# Patient Record
Sex: FEMALE | Race: WHITE | NOT HISPANIC OR LATINO | URBAN - METROPOLITAN AREA
[De-identification: names, ages, dates, MRNs, and addresses within clinical notes are randomized per-mention and may not be internally consistent; named-entity substitution may affect disease eponyms.]

---

## 2021-06-02 ENCOUNTER — EMERGENCY (EMERGENCY)
Facility: HOSPITAL | Age: 51
LOS: 0 days | Discharge: HOME | End: 2021-06-02
Attending: EMERGENCY MEDICINE | Admitting: EMERGENCY MEDICINE
Payer: MEDICAID

## 2021-06-02 VITALS
WEIGHT: 124.34 LBS | SYSTOLIC BLOOD PRESSURE: 95 MMHG | RESPIRATION RATE: 20 BRPM | TEMPERATURE: 98 F | OXYGEN SATURATION: 100 % | HEART RATE: 81 BPM | DIASTOLIC BLOOD PRESSURE: 53 MMHG

## 2021-06-02 DIAGNOSIS — R63.4 ABNORMAL WEIGHT LOSS: ICD-10-CM

## 2021-06-02 DIAGNOSIS — F17.200 NICOTINE DEPENDENCE, UNSPECIFIED, UNCOMPLICATED: ICD-10-CM

## 2021-06-02 DIAGNOSIS — I95.9 HYPOTENSION, UNSPECIFIED: ICD-10-CM

## 2021-06-02 LAB
ALBUMIN SERPL ELPH-MCNC: 3.6 G/DL — SIGNIFICANT CHANGE UP (ref 3.5–5.2)
ALP SERPL-CCNC: 115 U/L — SIGNIFICANT CHANGE UP (ref 30–115)
ALT FLD-CCNC: 67 U/L — HIGH (ref 0–41)
ANION GAP SERPL CALC-SCNC: 9 MMOL/L — SIGNIFICANT CHANGE UP (ref 7–14)
AST SERPL-CCNC: 61 U/L — HIGH (ref 0–41)
BASOPHILS # BLD AUTO: 0.05 K/UL — SIGNIFICANT CHANGE UP (ref 0–0.2)
BASOPHILS NFR BLD AUTO: 0.5 % — SIGNIFICANT CHANGE UP (ref 0–1)
BILIRUB SERPL-MCNC: <0.2 MG/DL — SIGNIFICANT CHANGE UP (ref 0.2–1.2)
BUN SERPL-MCNC: 7 MG/DL — LOW (ref 10–20)
CALCIUM SERPL-MCNC: 9 MG/DL — SIGNIFICANT CHANGE UP (ref 8.5–10.1)
CHLORIDE SERPL-SCNC: 103 MMOL/L — SIGNIFICANT CHANGE UP (ref 98–110)
CO2 SERPL-SCNC: 24 MMOL/L — SIGNIFICANT CHANGE UP (ref 17–32)
CREAT SERPL-MCNC: 0.6 MG/DL — LOW (ref 0.7–1.5)
EOSINOPHIL # BLD AUTO: 0.24 K/UL — SIGNIFICANT CHANGE UP (ref 0–0.7)
EOSINOPHIL NFR BLD AUTO: 2.6 % — SIGNIFICANT CHANGE UP (ref 0–8)
GLUCOSE SERPL-MCNC: 103 MG/DL — HIGH (ref 70–99)
HCG SERPL QL: NEGATIVE — SIGNIFICANT CHANGE UP
HCT VFR BLD CALC: 36.7 % — LOW (ref 37–47)
HGB BLD-MCNC: 12.2 G/DL — SIGNIFICANT CHANGE UP (ref 12–16)
IMM GRANULOCYTES NFR BLD AUTO: 0.2 % — SIGNIFICANT CHANGE UP (ref 0.1–0.3)
LACTATE SERPL-SCNC: 0.8 MMOL/L — SIGNIFICANT CHANGE UP (ref 0.7–2)
LYMPHOCYTES # BLD AUTO: 4.08 K/UL — HIGH (ref 1.2–3.4)
LYMPHOCYTES # BLD AUTO: 44.2 % — SIGNIFICANT CHANGE UP (ref 20.5–51.1)
MCHC RBC-ENTMCNC: 31.5 PG — HIGH (ref 27–31)
MCHC RBC-ENTMCNC: 33.2 G/DL — SIGNIFICANT CHANGE UP (ref 32–37)
MCV RBC AUTO: 94.8 FL — SIGNIFICANT CHANGE UP (ref 81–99)
MONOCYTES # BLD AUTO: 0.47 K/UL — SIGNIFICANT CHANGE UP (ref 0.1–0.6)
MONOCYTES NFR BLD AUTO: 5.1 % — SIGNIFICANT CHANGE UP (ref 1.7–9.3)
NEUTROPHILS # BLD AUTO: 4.38 K/UL — SIGNIFICANT CHANGE UP (ref 1.4–6.5)
NEUTROPHILS NFR BLD AUTO: 47.4 % — SIGNIFICANT CHANGE UP (ref 42.2–75.2)
NRBC # BLD: 0 /100 WBCS — SIGNIFICANT CHANGE UP (ref 0–0)
PLATELET # BLD AUTO: 315 K/UL — SIGNIFICANT CHANGE UP (ref 130–400)
POTASSIUM SERPL-MCNC: 4.6 MMOL/L — SIGNIFICANT CHANGE UP (ref 3.5–5)
POTASSIUM SERPL-SCNC: 4.6 MMOL/L — SIGNIFICANT CHANGE UP (ref 3.5–5)
PROT SERPL-MCNC: 6.3 G/DL — SIGNIFICANT CHANGE UP (ref 6–8)
RBC # BLD: 3.87 M/UL — LOW (ref 4.2–5.4)
RBC # FLD: 14.3 % — SIGNIFICANT CHANGE UP (ref 11.5–14.5)
SODIUM SERPL-SCNC: 136 MMOL/L — SIGNIFICANT CHANGE UP (ref 135–146)
WBC # BLD: 9.24 K/UL — SIGNIFICANT CHANGE UP (ref 4.8–10.8)
WBC # FLD AUTO: 9.24 K/UL — SIGNIFICANT CHANGE UP (ref 4.8–10.8)

## 2021-06-02 PROCEDURE — 93010 ELECTROCARDIOGRAM REPORT: CPT

## 2021-06-02 PROCEDURE — 71045 X-RAY EXAM CHEST 1 VIEW: CPT | Mod: 26

## 2021-06-02 PROCEDURE — 99285 EMERGENCY DEPT VISIT HI MDM: CPT

## 2021-06-02 RX ORDER — SODIUM CHLORIDE 9 MG/ML
1000 INJECTION INTRAMUSCULAR; INTRAVENOUS; SUBCUTANEOUS ONCE
Refills: 0 | Status: COMPLETED | OUTPATIENT
Start: 2021-06-02 | End: 2021-06-02

## 2021-06-02 RX ADMIN — SODIUM CHLORIDE 1000 MILLILITER(S): 9 INJECTION INTRAMUSCULAR; INTRAVENOUS; SUBCUTANEOUS at 21:58

## 2021-06-02 NOTE — ED ADULT TRIAGE NOTE - BEFAST ARM SIDE DRIFT
Patient Class[de-identified] Observation [202] Type of Bed: Telemetry [19] Reason for Observation: Tachycardia Admitting Diagnosis: Tachycardia [[id number]] Admitting Physician: Agustin Ryan [[id number]] Attending Physician: Agustin Ryan [[id number]] No

## 2021-06-02 NOTE — ED ADULT NURSE NOTE - OBJECTIVE STATEMENT
pt complains of loss of appetite and unintentional weight loss recently, pt denies abd pain, n/v/d, dizziness, palpitations, numbness, tingling

## 2021-06-02 NOTE — ED PROVIDER NOTE - NSFOLLOWUPINSTRUCTIONS_ED_ALL_ED_FT
Hypotension (Low Blood Pressure)    Foods  Whole grains. Lean meats. Low-fat dairy products. Eat extra salt only as directed. Do not add extra salt to your diet unless your health care provider told you to do that. Eat frequent, small meals. Avoid standing up suddenly after eating.    Medicines   Take over-the-counter and prescription medicines only as told by your health care provider.  Follow instructions from your health care provider about changing the dosage of your current medicines, if this applies.  Do not stop or adjust any of your medicines on your own.    General instructions   Wear compression stockings as told by your health care provider.  Get up slowly from lying down or sitting positions. This gives your blood pressure a chance to adjust.  Avoid hot showers and excessive heat as directed by your health care provider.  Return to your normal activities as told by your health care provider. Ask your health care provider what activities are safe for you.  Do not use any products that contain nicotine or tobacco, such as cigarettes, e-cigarettes, and chewing tobacco. If you need help quitting, ask your health care provider.  Keep all follow-up visits as told by your health care provider. This is important.    Contact a health care provider if you:  Vomit. Have diarrhea. Have a fever for more than 2–3 days. Feel more thirsty than usual. Feel weak and tired.    Get help right away if you:  Have chest pain. Have a fast or irregular heartbeat. Develop numbness in any part of your body. Cannot move your arms or your legs. Have trouble speaking. Become sweaty or feel light-headed. Faint. Feel short of breath. Have trouble staying awake. Feel confused.    Summary  Hypotension is when the force of blood pumping through your arteries is too weak. Hypotension may be harmless (benign) or may cause serious problems (be critical). Treatment for this condition may include changing your diet, changing your medicines, and wearing compression stockings. In some cases, you may need to go to the hospital for fluid or blood replacement. This information is not intended to replace advice given to you by your health care provider. Make sure you discuss any questions you have with your health care provider.

## 2021-06-02 NOTE — ED PROVIDER NOTE - ATTENDING CONTRIBUTION TO CARE
50Y F with PMH of HTN?, not on any medications presents with CC of weight loss and hypotension. Patient was at PMD's office for evaluation of having progressive weight loss about 20 lbs over 2 months, and found to have systolic in the 80s. Denies fevers, chills, chest pain, SOB, abdominal pain, N/V/D. No family hx of cancer.    50F smoker pmh anemia on supple Fe referred from PCPs office for wt loss & hypotension. Rpts seeing her pcp today because family/friends have been telling her she lost too much weight. Saw a new NP @ her PCPs office today,  was reviewed and pt found with 13 lb wt loss over 1 year. Also found hypotensive in office 80s/50s, although pt endorses known h/o lower BP. NP advised pt to come to ED for further testing. Pt denies any other sx. No f/c, uri sx, cp/sob/more, cough, hemoptysis, nvd, abd pain, flank pain, urinary sx, vag discharge, rash, edema. Has never had colonoscopy. No reported h/o familial Tone.    PE:  nad  skin warm, dry  ncat  neck supple  rrr nl s1s2 no mrg  ctab no wrr  abd soft ntnd no palpable masses no rgr  back non-tender no cvat  ext no cce dpi  neuro aaox3 grossly nf exam

## 2021-06-02 NOTE — ED PROVIDER NOTE - NS ED ROS FT
Review of Systems:  CONSTITUTIONAL - No fever  SKIN - No rash  HEMATOLOGIC - No abnormal bleeding or bruising  RESPIRATORY - No shortness of breath, No cough  CARDIAC -No chest pain, No palpitations  GI - No abdominal pain, No nausea, No vomiting  - No dysuria, frequency, hematuria  MUSCULOSKELETAL - No joint paint, No swelling, No back pain  NEUROLOGIC - No numbness, No focal weakness, No headache, No dizziness  All other systems negative, unless specified in HPI

## 2021-06-02 NOTE — ED PROVIDER NOTE - CLINICAL SUMMARY MEDICAL DECISION MAKING FREE TEXT BOX
wt loss, hypotension (although reported history of chronic low bp), referred to ED for further testing - vss, ED w/u incl ekg, cxr, labs showing mild transaminitis, ua wnl - ct ap was ordered for screening purposes, however pt declined study requesting to be discharged to home as she has a child with special needs to care for, prefers to obtain ct as outpt - all results d/w pt & copies given, strict return precautions discussed, rec outpt PCP f/u - pt also has a previously scheduled appt with her Hem/Onc (sees for anemia) this Mon 6/7

## 2021-06-02 NOTE — ED PROVIDER NOTE - PROVIDER TOKENS
PROVIDER:[TOKEN:[[id number]],FOLLOWUP:[Urgent],ESTABLISHEDPATIENT:[T]],FREE:[LAST:[Hematologist/Oncologist],PHONE:[(   )    -],FAX:[(   )    -],ADDRESS:[your private Hem/Onc physician, as previously scheduled for this Mon 6/7/21],ESTABLISHEDPATIENT:[T]]

## 2021-06-02 NOTE — ED ADULT TRIAGE NOTE - CHIEF COMPLAINT QUOTE
patient c/o poor appetite x "a few weeks", went to PMD today for checkup and was hypotensive to 80s systolically. unintentional 13 lb weight loss since 2020. sent in for eval

## 2021-06-02 NOTE — ED PROVIDER NOTE - OBJECTIVE STATEMENT
50Y F with PMH of HTN?, not on any medications presents with CC of weight loss and hypotension. Patient was at PMD's office for evaluation of having progressive weight loss about 20 lbs over 2 months, and found to have systolic in the 80s. Denies fevers, chills, chest pain, SOB, abdominal pain, N/V/D. No family hx of cancer.

## 2021-06-02 NOTE — ED PROVIDER NOTE - PROGRESS NOTE DETAILS
PP: Patient does not want to stay for CT scans. Understands that weight loss can be related to benign causes but also can be cancer. Patient has kids at home that she needs to attend to and would like to go home. She understands to follow up with her PMD. BP here 95 systolic, as per patient she does have hx of low bp and is not concerned about it. Patient to be discharged from ED. Any available test results were discussed with patient and/or family. Verbal instructions given, including instructions to return to ED immediately for any new, worsening, or concerning symptoms. Patient endorsed understanding. Written discharge instructions additionally given, including follow-up plan.

## 2021-06-02 NOTE — ED PROVIDER NOTE - PATIENT PORTAL LINK FT
You can access the FollowMyHealth Patient Portal offered by [facility] by registering at the following website: [email or web address]. By joining [email or web address]’s FollowMyHealth portal, you will also be able to view your health information using other applications (apps) compatible with our system.

## 2021-06-02 NOTE — ED PROVIDER NOTE - CARE PROVIDER_API CALL
Alonso Bowers (NP; RN)  [place] [address]  Phone: [phone]  Fax: [phone]  Established Patient  Follow Up Time: Urgent    Hematologist/Oncologist,   your private Hem/Onc physician, as previously scheduled for this Mon 6/7/21  Phone: (   )    -  Fax: (   )    -  Established Patient  Follow Up Time:

## 2021-06-08 LAB
CULTURE RESULTS: SIGNIFICANT CHANGE UP
CULTURE RESULTS: SIGNIFICANT CHANGE UP
SPECIMEN SOURCE: SIGNIFICANT CHANGE UP
SPECIMEN SOURCE: SIGNIFICANT CHANGE UP

## 2022-05-08 ENCOUNTER — INPATIENT (INPATIENT)
Facility: HOSPITAL | Age: 52
LOS: 2 days | Discharge: HOME | End: 2022-05-11
Attending: PSYCHIATRY & NEUROLOGY | Admitting: PSYCHIATRY & NEUROLOGY
Payer: MEDICAID

## 2022-05-08 VITALS
DIASTOLIC BLOOD PRESSURE: 73 MMHG | HEART RATE: 101 BPM | TEMPERATURE: 98 F | OXYGEN SATURATION: 99 % | RESPIRATION RATE: 18 BRPM | SYSTOLIC BLOOD PRESSURE: 121 MMHG

## 2022-05-08 LAB
ALBUMIN SERPL ELPH-MCNC: 4.6 G/DL — SIGNIFICANT CHANGE UP (ref 3.5–5.2)
ALP SERPL-CCNC: 85 U/L — SIGNIFICANT CHANGE UP (ref 30–115)
ALT FLD-CCNC: 23 U/L — SIGNIFICANT CHANGE UP (ref 0–41)
ANION GAP SERPL CALC-SCNC: 15 MMOL/L — HIGH (ref 7–14)
APPEARANCE UR: CLEAR — SIGNIFICANT CHANGE UP
APTT BLD: SIGNIFICANT CHANGE UP SEC (ref 27–39.2)
AST SERPL-CCNC: 34 U/L — SIGNIFICANT CHANGE UP (ref 0–41)
BACTERIA # UR AUTO: NEGATIVE — SIGNIFICANT CHANGE UP
BASOPHILS # BLD AUTO: 0.05 K/UL — SIGNIFICANT CHANGE UP (ref 0–0.2)
BASOPHILS NFR BLD AUTO: 0.3 % — SIGNIFICANT CHANGE UP (ref 0–1)
BILIRUB SERPL-MCNC: <0.2 MG/DL — SIGNIFICANT CHANGE UP (ref 0.2–1.2)
BILIRUB UR-MCNC: NEGATIVE — SIGNIFICANT CHANGE UP
BUN SERPL-MCNC: 8 MG/DL — LOW (ref 10–20)
CALCIUM SERPL-MCNC: 9.8 MG/DL — SIGNIFICANT CHANGE UP (ref 8.5–10.1)
CHLORIDE SERPL-SCNC: 107 MMOL/L — SIGNIFICANT CHANGE UP (ref 98–110)
CO2 SERPL-SCNC: 20 MMOL/L — SIGNIFICANT CHANGE UP (ref 17–32)
COLOR SPEC: YELLOW — SIGNIFICANT CHANGE UP
COMMENT - URINE: SIGNIFICANT CHANGE UP
CREAT SERPL-MCNC: 0.6 MG/DL — LOW (ref 0.7–1.5)
DIFF PNL FLD: NEGATIVE — SIGNIFICANT CHANGE UP
EGFR: 110 ML/MIN/1.73M2 — SIGNIFICANT CHANGE UP
EOSINOPHIL # BLD AUTO: 0.19 K/UL — SIGNIFICANT CHANGE UP (ref 0–0.7)
EOSINOPHIL NFR BLD AUTO: 1.3 % — SIGNIFICANT CHANGE UP (ref 0–8)
EPI CELLS # UR: 3 /HPF — SIGNIFICANT CHANGE UP (ref 0–5)
GLUCOSE SERPL-MCNC: 88 MG/DL — SIGNIFICANT CHANGE UP (ref 70–99)
GLUCOSE UR QL: NEGATIVE — SIGNIFICANT CHANGE UP
HCT VFR BLD CALC: 34 % — LOW (ref 37–47)
HGB BLD-MCNC: 11.5 G/DL — LOW (ref 12–16)
HYALINE CASTS # UR AUTO: 2 /LPF — SIGNIFICANT CHANGE UP (ref 0–7)
IMM GRANULOCYTES NFR BLD AUTO: 0.5 % — HIGH (ref 0.1–0.3)
INR BLD: 0.98 RATIO — SIGNIFICANT CHANGE UP (ref 0.65–1.3)
KETONES UR-MCNC: NEGATIVE — SIGNIFICANT CHANGE UP
LEUKOCYTE ESTERASE UR-ACNC: NEGATIVE — SIGNIFICANT CHANGE UP
LYMPHOCYTES # BLD AUTO: 21.4 % — SIGNIFICANT CHANGE UP (ref 20.5–51.1)
LYMPHOCYTES # BLD AUTO: 3.12 K/UL — SIGNIFICANT CHANGE UP (ref 1.2–3.4)
MCHC RBC-ENTMCNC: 33.8 G/DL — SIGNIFICANT CHANGE UP (ref 32–37)
MCHC RBC-ENTMCNC: 34.4 PG — HIGH (ref 27–31)
MCV RBC AUTO: 101.8 FL — HIGH (ref 81–99)
MONOCYTES # BLD AUTO: 0.69 K/UL — HIGH (ref 0.1–0.6)
MONOCYTES NFR BLD AUTO: 4.7 % — SIGNIFICANT CHANGE UP (ref 1.7–9.3)
NEUTROPHILS # BLD AUTO: 10.46 K/UL — HIGH (ref 1.4–6.5)
NEUTROPHILS NFR BLD AUTO: 71.8 % — SIGNIFICANT CHANGE UP (ref 42.2–75.2)
NITRITE UR-MCNC: NEGATIVE — SIGNIFICANT CHANGE UP
NRBC # BLD: 0 /100 WBCS — SIGNIFICANT CHANGE UP (ref 0–0)
PH UR: 6.5 — SIGNIFICANT CHANGE UP (ref 5–8)
PLATELET # BLD AUTO: 273 K/UL — SIGNIFICANT CHANGE UP (ref 130–400)
POTASSIUM SERPL-MCNC: 4 MMOL/L — SIGNIFICANT CHANGE UP (ref 3.5–5)
POTASSIUM SERPL-SCNC: 4 MMOL/L — SIGNIFICANT CHANGE UP (ref 3.5–5)
PROT SERPL-MCNC: 7.1 G/DL — SIGNIFICANT CHANGE UP (ref 6–8)
PROT UR-MCNC: ABNORMAL
PROTHROM AB SERPL-ACNC: 11.3 SEC — SIGNIFICANT CHANGE UP (ref 9.95–12.87)
RBC # BLD: 3.34 M/UL — LOW (ref 4.2–5.4)
RBC # FLD: 15 % — HIGH (ref 11.5–14.5)
RBC CASTS # UR COMP ASSIST: 2 /HPF — SIGNIFICANT CHANGE UP (ref 0–4)
SARS-COV-2 RNA SPEC QL NAA+PROBE: SIGNIFICANT CHANGE UP
SODIUM SERPL-SCNC: 142 MMOL/L — SIGNIFICANT CHANGE UP (ref 135–146)
SP GR SPEC: >1.05 (ref 1.01–1.03)
TROPONIN T SERPL-MCNC: <0.01 NG/ML — SIGNIFICANT CHANGE UP
UROBILINOGEN FLD QL: SIGNIFICANT CHANGE UP
WBC # BLD: 14.58 K/UL — HIGH (ref 4.8–10.8)
WBC # FLD AUTO: 14.58 K/UL — HIGH (ref 4.8–10.8)
WBC UR QL: 4 /HPF — SIGNIFICANT CHANGE UP (ref 0–5)

## 2022-05-08 PROCEDURE — 0042T: CPT

## 2022-05-08 PROCEDURE — 71045 X-RAY EXAM CHEST 1 VIEW: CPT | Mod: 26

## 2022-05-08 PROCEDURE — 70496 CT ANGIOGRAPHY HEAD: CPT | Mod: 26,MA

## 2022-05-08 PROCEDURE — 70498 CT ANGIOGRAPHY NECK: CPT | Mod: 26,MA

## 2022-05-08 PROCEDURE — 99285 EMERGENCY DEPT VISIT HI MDM: CPT

## 2022-05-08 PROCEDURE — 70450 CT HEAD/BRAIN W/O DYE: CPT | Mod: 26,MA

## 2022-05-08 PROCEDURE — 93010 ELECTROCARDIOGRAM REPORT: CPT

## 2022-05-08 RX ORDER — ASPIRIN/CALCIUM CARB/MAGNESIUM 324 MG
325 TABLET ORAL ONCE
Refills: 0 | Status: COMPLETED | OUTPATIENT
Start: 2022-05-08 | End: 2022-05-08

## 2022-05-08 RX ORDER — QUETIAPINE FUMARATE 200 MG/1
2 TABLET, FILM COATED ORAL
Qty: 0 | Refills: 0 | DISCHARGE

## 2022-05-08 RX ORDER — ENOXAPARIN SODIUM 100 MG/ML
40 INJECTION SUBCUTANEOUS EVERY 24 HOURS
Refills: 0 | Status: DISCONTINUED | OUTPATIENT
Start: 2022-05-08 | End: 2022-05-11

## 2022-05-08 RX ORDER — CLONAZEPAM 1 MG
1 TABLET ORAL
Qty: 0 | Refills: 0 | DISCHARGE

## 2022-05-08 RX ORDER — CLOPIDOGREL BISULFATE 75 MG/1
300 TABLET, FILM COATED ORAL ONCE
Refills: 0 | Status: COMPLETED | OUTPATIENT
Start: 2022-05-08 | End: 2022-05-08

## 2022-05-08 RX ORDER — CLOPIDOGREL BISULFATE 75 MG/1
75 TABLET, FILM COATED ORAL DAILY
Refills: 0 | Status: DISCONTINUED | OUTPATIENT
Start: 2022-05-09 | End: 2022-05-11

## 2022-05-08 RX ORDER — ALBUTEROL 90 UG/1
2 AEROSOL, METERED ORAL EVERY 6 HOURS
Refills: 0 | Status: DISCONTINUED | OUTPATIENT
Start: 2022-05-08 | End: 2022-05-11

## 2022-05-08 RX ORDER — SODIUM CHLORIDE 9 MG/ML
1000 INJECTION INTRAMUSCULAR; INTRAVENOUS; SUBCUTANEOUS
Refills: 0 | Status: DISCONTINUED | OUTPATIENT
Start: 2022-05-08 | End: 2022-05-11

## 2022-05-08 RX ORDER — ASPIRIN/CALCIUM CARB/MAGNESIUM 324 MG
81 TABLET ORAL DAILY
Refills: 0 | Status: DISCONTINUED | OUTPATIENT
Start: 2022-05-09 | End: 2022-05-11

## 2022-05-08 RX ORDER — ATORVASTATIN CALCIUM 80 MG/1
80 TABLET, FILM COATED ORAL AT BEDTIME
Refills: 0 | Status: DISCONTINUED | OUTPATIENT
Start: 2022-05-08 | End: 2022-05-11

## 2022-05-08 RX ORDER — CLONAZEPAM 1 MG
2 TABLET ORAL DAILY
Refills: 0 | Status: DISCONTINUED | OUTPATIENT
Start: 2022-05-08 | End: 2022-05-10

## 2022-05-08 RX ORDER — QUETIAPINE FUMARATE 200 MG/1
0 TABLET, FILM COATED ORAL
Qty: 0 | Refills: 0 | DISCHARGE

## 2022-05-08 RX ORDER — QUETIAPINE FUMARATE 200 MG/1
400 TABLET, FILM COATED ORAL AT BEDTIME
Refills: 0 | Status: DISCONTINUED | OUTPATIENT
Start: 2022-05-08 | End: 2022-05-11

## 2022-05-08 RX ORDER — CLONAZEPAM 1 MG
0 TABLET ORAL
Qty: 0 | Refills: 0 | DISCHARGE

## 2022-05-08 RX ADMIN — CLOPIDOGREL BISULFATE 300 MILLIGRAM(S): 75 TABLET, FILM COATED ORAL at 21:07

## 2022-05-08 RX ADMIN — ENOXAPARIN SODIUM 40 MILLIGRAM(S): 100 INJECTION SUBCUTANEOUS at 22:50

## 2022-05-08 RX ADMIN — Medication 325 MILLIGRAM(S): at 21:08

## 2022-05-08 RX ADMIN — SODIUM CHLORIDE 75 MILLILITER(S): 9 INJECTION INTRAMUSCULAR; INTRAVENOUS; SUBCUTANEOUS at 22:20

## 2022-05-08 RX ADMIN — QUETIAPINE FUMARATE 400 MILLIGRAM(S): 200 TABLET, FILM COATED ORAL at 22:46

## 2022-05-08 RX ADMIN — ATORVASTATIN CALCIUM 80 MILLIGRAM(S): 80 TABLET, FILM COATED ORAL at 22:46

## 2022-05-08 NOTE — ED PROVIDER NOTE - ATTENDING APP SHARED VISIT CONTRIBUTION OF CARE
51 yr old female called as stroke code  hx seizures, recently seen at OSH ,   has been confused and acting differently since 6/7 am  R leg drift on exam  stroke code called  for CT imaging, discussed with neuro PA and tele stroke  dispo per

## 2022-05-08 NOTE — H&P ADULT - HISTORY OF PRESENT ILLNESS
52 y/o female with hx of seizure( recently got discharged from [facility] for new onset of seizure, not any AED, suppose to follow up with Dr. Finn), current smoker, depression who was brought in for AMS, ?seizure, slurred speech. Patient states her LKW- was yesterday, unknown time. Patient states that her son said she possibly had a seizure while she was sleeping where both her hands were moving up and down that possibly could be between ~2-7 AM. Patient states she did not come earlier to the hospital because of her kids. Patient is a poor historian. Reports that she noticed B/L lower extremity weakness when she woke up this morning and could not walk. Patient states she feels unsteady on her feet, but does not believe she swaying to one side. Also noted the slurred speech this morning as well. Patient was a stroke code as a pre note for dysarthria as per EMS. Patient states she had 2 seizures where she had foaming in the mouth which is why she went to [facility], does not remember the events. EMS noticed dysarthria on scene. Admits to headache, feeling confused "out of this world" experience, , dizziness. Denies nausea, vomiting, tongue biting, urinary, fecal incontinence. Does not take any blood thinners. CTH-negative for acute findings. CTA-no LVO. CTP-Apparent 33 cc mismatch volume within the right temporal lobe and bilateral areas of the cerebellum and frontal lobes that is likely artifactual. Neurology saw him in ED, Discussed case with [facility] attending, Dr. Matamoros, not a tpa candidate as LKW- out of window.     in ED, VS were stable,  labs done showed WBC Count: 14.58 Hemoglobin: 11.5 Mean Cell Volume: 101.8  pt received Aspirin, plavix loading dose.   pt is admitted for workup/management 50 y/o female with hx of seizure( recently got discharged from [facility] for new onset of seizure, not any AED, suppose to follow up with Dr. Finn), current smoker, bipolar disease depression who was brought in for AMS, ?seizure, slurred speech. Patient states her LKW- was yesterday, unknown time. Patient states that her son said she possibly had a seizure while she was sleeping where both her hands were moving up and down that possibly could be between ~2-7 AM. Patient states she did not come earlier to the hospital because of her kids. Patient is a poor historian. Reports that she noticed B/L lower extremity weakness when she woke up this morning and could not walk. Patient states she feels unsteady on her feet, but does not believe she swaying to one side. Also noted the slurred speech this morning as well. Patient was a stroke code as a pre note for dysarthria as per EMS. Patient states she had 2 seizures where she had foaming in the mouth which is why she went to [facility], does not remember the events. EMS noticed dysarthria on scene. Admits to headache, feeling confused "out of this world" experience, , dizziness. Denies nausea, vomiting, tongue biting, urinary, fecal incontinence. Does not take any blood thinners. CTH-negative for acute findings. CTA-no LVO. CTP-Apparent 33 cc mismatch volume within the right temporal lobe and bilateral areas of the cerebellum and frontal lobes that is likely artifactual. Neurology saw him in ED, Discussed case with CTC attending, Dr. Matamoros, not a tpa candidate as LKW- out of window.     in ED, VS were stable,  labs done showed WBC Count: 14.58 Hemoglobin: 11.5 Mean Cell Volume: 101.8  pt received Aspirin, plavix loading dose.   pt is admitted for workup/management

## 2022-05-08 NOTE — ED PROVIDER NOTE - OBJECTIVE STATEMENT
50 y/o female with hx of seizure who was brought in for dysarthria and seizure activity. Reports that she noticed B/L lower extremity weakness around 6:30 this morning, but ignored the symptom. EMS was called because family noticed shaking motion for 30 seconds and EMS was called. EMS noticed dysarthria on scene. Denies fever, SOB, chest pain, N/V, abdominal pain, urinary symptoms, and change in bowel movement

## 2022-05-08 NOTE — ED PROVIDER NOTE - NS ED ATTENDING STATEMENT MOD
Attending Only This was a shared visit with the GERRY. I reviewed and verified the documentation and independently performed the documented:

## 2022-05-08 NOTE — ED ADULT NURSE NOTE - NSIMPLEMENTINTERV_GEN_ALL_ED
Implemented All Fall with Harm Risk Interventions:  [place] to call system. Call bell, personal items and telephone within reach. Instruct patient to call for assistance. Room bathroom lighting operational. Non-slip footwear when patient is off stretcher. Physically safe environment: no spills, clutter or unnecessary equipment. Stretcher in lowest position, wheels locked, appropriate side rails in place. Provide visual cue, wrist band, yellow gown, etc. Monitor gait and stability. Monitor for mental status changes and reorient to person, place, and time. Review medications for side effects contributing to fall risk. Reinforce activity limits and safety measures with patient and family. Provide visual clues: red socks.

## 2022-05-08 NOTE — ED PROVIDER NOTE - NS ED ROS FT
Constitutional: Negative for fever  HENT: Negative for headache,  Eyes: Negative for eye pain, and vision change.  Cardiovascular: Negative for chest pain, and palpitation.  Respiratory: Negative for SOB, wheezing, cough and sputum production.  Gastrointestinal: Negative for nausea, vomiting, abdominal pain, constipation, diarrhea, hematochezia, and melena.  Genitourinary: Negative for flank pain, dysuria, frequency, and hematuria.  Neurological:+ B/L lower extremity weakness. Negative for dizziness, syncope, focal weakness, numbness, and loss of consciousness.  Musculoskeletal: Negative for joint swelling, arthralgias, back pain, neck pain, and calf cramps.  Hematological: Does not bruise/bleed easily.

## 2022-05-08 NOTE — H&P ADULT - NSHPLABSRESULTS_GEN_ALL_CORE
11.5   14.58 )-----------( 273      ( 08 May 2022 18:28 )             34.0       05-08    142  |  107  |  8<L>  ----------------------------<  88  4.0   |  20  |  0.6<L>    Ca    9.8      08 May 2022 18:28    TPro  7.1  /  Alb  4.6  /  TBili  <0.2  /  DBili  x   /  AST  34  /  ALT  23  /  AlkPhos  85  05-08                  PT/INR - ( 08 May 2022 18:28 )   PT: TNP sec;   INR: TNP ratio         PTT - ( 08 May 2022 18:28 )  PTT:TNP sec    Lactate Trend      CARDIAC MARKERS ( 08 May 2022 18:28 )  x     / <0.01 ng/mL / x     / x     / x            CAPILLARY BLOOD GLUCOSE        < from: CT Angio Neck w/ IV Cont (05.08.22 @ 18:14) >      No evidence of CBF less than 30%. Apparent 33 cc mismatch volume within   the right temporal lobe and bilateral areas of thecerebellum and frontal   lobes that is likely artifactual, as above.    CTA HEAD:    No evidence of vessel occlusion or aneurysm.    CTA Neck:    No evidence of greater than 50% carotid or vertebral artery stenosis.    Numerous right apical pulmonarynodules measuring up to 5 mm   (groundglass) and 4 mm (solid), few scattered left apical nodules.   Consider nonemergent dedicated CT chest versus short-term 3 month   follow-up CT chest for further evaluation.      < end of copied text >

## 2022-05-08 NOTE — ED ADULT NURSE NOTE - OBJECTIVE STATEMENT
51 year old female alert and oriented BIBA presents with slurred speech. Patient states she feels confused since last night. Per patient she was ai [facility] last week for seizures. Patient stated she was not started on any medication. VSS

## 2022-05-08 NOTE — H&P ADULT - ASSESSMENT
50 y/o female with hx of seizure( recently got discharged from [facility] for new onset of seizure, not any AED, suppose to follow up with Dr. Finn), current smoker, depression who was brought in for AMS, ?seizure, slurred speech. Patient states her LKW- was yesterday, unknown time. Patient states that her son said she possibly had a seizure while she was sleeping where both her hands were moving up and down that possibly could be between ~2-7 AM. Patient states she did not come earlier to the hospital because of her kids. Patient is a poor historian. Reports that she noticed B/L lower extremity weakness when she woke up this morning and could not walk. Patient states she feels unsteady on her feet, but does not believe she swaying to one side. Also noted the slurred speech this morning as well. Patient was a stroke code as a pre note for dysarthria as per EMS. Patient states she had 2 seizures where she had foaming in the mouth which is why she went to [facility], does not remember the events. EMS noticed dysarthria on scene. Admits to headache, feeling confused "out of this world" experience, , dizziness. Denies nausea, vomiting, tongue biting, urinary, fecal incontinence. Does not take any blood thinners.    #hx of seizure  #ro Stroke vs TIA    - CTH-negative for acute findings. CTA-no LVO. CTP-Apparent 33 cc mismatch volume within the right temporal lobe and bilateral areas of the cerebellum and frontal lobes that is likely artifactual.  -  Neurology saw him in ED, Discussed case with CTC attending, Dr. Matamoros, not a tpa candidate as LKW- out of window.   - in ED, VS were stable,  labs done showed WBC Count: 14.58 Hemoglobin: 11.5 Mean Cell Volume: 101.8  - pt received Aspirin, plavix loading dose.   - Admit to stroke unit/CEU for q4 neurochecks under Dr. Gandhi.   -  mg x once . Continue with ASA 81 mg tomorrow   - Plavix 300 mg x once. Continue with Plavix 75 mg tomorrow   - IVF  - Dysphagia screen   - Infectious workup : fu blood cx, ucx, CXR  - MRI brain without contrast   - [id number]  - rEEG  - keep Mg>2  - TTE with bubble   - Seizure precautions  - Fall precautions   - a1c,tsh   - PT/OT/ST    #Leucocytosis  #Macrocytic anemia  - no source of infection  - Blood cx, ucx.   - hb 11.5 .   - Check b12,folate   - Hospitalist consult    Activity: As tolerated, PT/OT  Diet: NPO, IVF  Dispo: Acute  DVT ppx: Lovenox  Full code 50 y/o female with hx of seizure( recently got discharged from [facility] for new onset of seizure, not any AED, suppose to follow up with Dr. Finn), current smoker, bipolar disease depression who was brought in for AMS, ?seizure, slurred speech. Patient states her LKW- was yesterday, unknown time. Patient states that her son said she possibly had a seizure while she was sleeping where both her hands were moving up and down that possibly could be between ~2-7 AM. Patient states she did not come earlier to the hospital because of her kids. Patient is a poor historian. Reports that she noticed B/L lower extremity weakness when she woke up this morning and could not walk. Patient states she feels unsteady on her feet, but does not believe she swaying to one side. Also noted the slurred speech this morning as well. Patient was a stroke code as a pre note for dysarthria as per EMS. Patient states she had 2 seizures where she had foaming in the mouth which is why she went to [facility], does not remember the events. EMS noticed dysarthria on scene. Admits to headache, feeling confused "out of this world" experience, , dizziness. Denies nausea, vomiting, tongue biting, urinary, fecal incontinence. Does not take any blood thinners.    #hx of seizure  #ro Stroke vs TIA  - CTH-negative for acute findings. CTA-no LVO. CTP-Apparent 33 cc mismatch volume within the right temporal lobe and bilateral areas of the cerebellum and frontal lobes that is likely artifactual.  -  Neurology saw him in ED, Discussed case with CTC attending, Dr. Matamoros, not a tpa candidate as LKW- out of window.   - in ED, VS were stable,  labs done showed WBC Count: 14.58 Hemoglobin: 11.5 Mean Cell Volume: 101.8  - pt received Aspirin, plavix loading dose.   - Admit to stroke unit/CEU for q4 neurochecks under Dr. Gandhi.   -  mg x once . Continue with ASA 81 mg tomorrow   - Plavix 300 mg x once. Continue with Plavix 75 mg tomorrow   - IVF  - Dysphagia screen   - Infectious workup : fu blood cx, ucx, CXR  - MRI brain without contrast   - [id number]  - rEEG  - keep Mg>2  - TTE with bubble   - Seizure precautions  - Fall precautions   - a1c,tsh   - PT/OT/ST    #Leucocytosis  #Macrocytic anemia  - no source of infection, WBC 14 K.   - follow up repeat CBC  - Blood cx, ucx.   - hb 11.5 .   - Check b12,folate   - Hospitalist consult    #Bipolar disease? depression  - conitnue Clonazepam, seroquel.     #COPD:  - not compliant with inhalers.   - not in exacerbation.   - Albuterol PRN.     Activity: As tolerated, PT/OT  Diet: NPO, IVF  Dispo: Acute  DVT ppx: Lovenox  Full code

## 2022-05-08 NOTE — ED PROVIDER NOTE - PHYSICAL EXAMINATION
CONSTITUTIONAL: Well-appearing; well-nourished; in no apparent distress.   HEAD: Normocephalic; atraumatic.   EYES: Pupils are round and reactive, extra-ocular muscles are intact. Eyelids are normal in appearance without swelling or lesions.   ENT: Hearing is intact with good acuity to spoken voice. Patient is speaking clearly, not muffled and airway is intact.   NECK: No midline tenderness  RESPIRATORY: No signs of respiratory distress. Lung sounds are clear in all lobes bilaterally without rales, rhonchi, or wheezes.  CARDIOVASCULAR: Regular rate and rhythm.   GI: Abdomen is soft, non-tender, and without distention. Bowel sounds are present and normoactive in all four quadrants. No masses are noted.   SKIN: Skin is warm, dry and intact without apparent rashes or lesions.   NEURO: A & O x 3. Normal speech. RLE drift noticed. Visual fields are full to confrontation. Pupils are equally reactive to light. Extraocular movement is intact. There is no eye deviation. Facial sensation is intact to light touch. Face is symmetric with normal eye closure and smile. Hearing is normal to spoken voice. Phonation is normal. Head turning and shoulder shrug are intact.   PSYCHOLOGICAL: Appropriate mood and affect. Good judgement and insight.

## 2022-05-08 NOTE — CONSULT NOTE ADULT - SUBJECTIVE AND OBJECTIVE BOX
Stroke CODE consult  Note:    MEAGHAN DANGELO    1. Chief Complaint:    HPI:52 y/o female with hx of seizure, current smoker, depression  recently got discharged from [facility] for new onset of seizure, not any AED)  who was brought in for AMS, ?seizure, slurred speech. Patient states her LKW- was yesterday, unknown time. Patient states that her son said she possibly had a seizure while she was sleeping where both her hands were moving up and down that possibly could be between ~2-7 AM. Patient states she did not come earlier to the hospital because of her kids. Patient is a poor historian. Reports that she noticed B/L lower extremity weakness when she woke up this morning and could not walk. Patient states she feels unsteady on her feet, but does not believe she swaying to one side. Patient was a stroke code as a pre note for dysarthria as per EMS. Patient states she had 2 seizures where she had foaming in the mouth which is why she went to [facility], does not remember the events. EMS noticed dysarthria on scene. Admits to headache, feeling confused "out of this world" experience, dizziness. Denies nausea, vomiting, tongue biting, urinary, fecal incontinence.     2. Relevant PMH:   Prior ischemic stroke/TIA[ ], Afib [ ], CAD [ ], HTN [ ], DLD [ ], DM [ ], PVD [ ], Obesity [ ],   Sedentary lifestyle [ ], CHF [ ], TORITO [ ], Cancer Hx [ ].    3. Social History: Smoking [ ], Drug Use [ ], Alcohol Use [ ], Other [ ]    4. Possible Location of Stroke:    5. Relevant Brain Tissue Imaging:  < from: CT Brain Stroke Protocol (22 @ 17:43) >  IMPRESSION:    No acute intracranial pathology.    < end of copied text >    6. Relevant Cerebrovascular Imaging:   CT Angio Neck w/ IV Cont:   ******PRELIMINARY REPORT******      ******PRELIMINARY REPORT******       ACC: [id number] EXAM:  CT ANGIO BRAIN (W)AW IC                        ACC: [id number] EXAM:  CT ANGIO NECK (W)AW IC                        ACC: [id number] EXAM:  CT PERFUSION W MAPSIC                          PROCEDURE DATE:  2022    ******PRELIMINARY REPORT******      ******PRELIMINARY REPORT******           INTERPRETATION:  Clinical History / Reason for exam: Stroke code. History   of seizure, altered mental status.    TECHNIQUE: Following the intravenous administration of 50 cc of Omnipaque   350 IV contrast, serial axial images were obtained through the brain. The   CT perfusion data set was post processed per [facility] protocol   generating color maps of CBF (cerebral blood flow), CBV (cerebral blood   volume), MTT (mean transit time), and Tmax. CT angiography of the   intracranial (head) and extracranial (neck) circulation was then   performed after initiation of an additional 70 cc of Omnipaque 350.   Multiple contiguous axial images from the skull base to vertex were   acquired. Maximal intensity projection images were additionally included   and reviewed. 3-D shaded surface reformations were made of vasculature.   3-D reformations were reviewedand included in interpretation of the   official report.    80 cc of contrast material was discarded.    Per technician the patient was unable to follow instructions due to her   medical condition and moved during the examination.    CAROTID STENOSISREFERENCE: (NASCET = 100x1-(N/D)). N=greatest narrowing.   D=normal distal diameter - MILD = <50% stenosis. - MODERATE = 50-69%   stenosis. - SEVERE = 70-89% stenosis. - HAIRLINE/CRITICAL = 90-99%   stenosis. - OCCLUDED = 100% stenosis.    COMPARISON: No direct comparison is available.    FINDINGS:    PERFUSION:    Patient motion degrades examination  CBF less than 30% volume: 0 cc  Tmax greater than 6 seconds: 33 cc  Mismatch volume: 33 cc corresponding to regions within the bilateral   cerebellar hemispheres, bilateral frontal lobes, and right temporal lobe   which do not correspond to a known vascular territory. Several of these   regions also appear to correlate with streak artifact on raw images that   is likely caused by patient motion.  Mismatch ratio: Infinite.    The CT perfusion study demonstrates no perfusion abnormality. There is an   apparent 33 cc mismatch volume within the right temporal lobe and   bilateral areas of the cerebellum and frontal lobes that is likely   artifactual.    HEAD CTA:    Internal carotid arteries demonstrate normal enhancement to the   intracranial circulation and [place].    ACAs and MCAs are patent bilaterally without evidence of vessel   occlusion, aneurysm, or vascular malformation.    Intradural vertebral arteries are patent to the vertebrobasilar   confluence. Posterior cerebral arteries demonstrate normal enhancement   and arborization without evidence of vascular occlusion, aneurysm, or   vascular malformation.    Visualized dural venous sinuses and deep cerebral venous structures   demonstrate normal enhancement without evidence of filling defect.    NECK CTA:    Three-vessel aortic arch. Origins of the arch vessels are within normal   limits.    Bilateral common carotidarteries are unremarkable. No evidence of   significant stenosis at the bilateral ICA or ECA origins. Bilateral   cervical ICAs are of normal course and caliber to the intracranial   circulation.    Origin of the right vertebral artery is obscured bystreak artifact   secondary to contrast. Origin of the left vertebral artery is within   normal limits. Bilateral vertebral arteries are normal course and caliber   to the intracranial circulation.    OTHER: Numerous right apical solid pulmonary nodules measuring up to 4   mm, multiple right apical groundglass pulmonary nodules measuring up to 5   mm (4-28, 4-68). Additional small scattered left apical nodules. Biapical   emphysematous changes.    Partially imaged bilateral breast implants with rim calcifications.    IMPRESSION:    PERFUSION:    Examination is degraded by patient motion and resulting streak artifact.    No evidence of CBF less than 30%. Apparent 33 cc mismatch volume within   the right temporal lobe and bilateral areas of thecerebellum and frontal   lobes that is likely artifactual, as above.    CTA HEAD:    No evidence of vessel occlusion or aneurysm.    CTA Neck:    No evidence of greater than 50% carotid or vertebral artery stenosis.    Numerous right apical pulmonarynodules measuring up to 5 mm   (groundglass) and 4 mm (solid), few scattered left apical nodules.   Consider nonemergent dedicated CT chest versus short-term 3 month   follow-up CT chest for further evaluation.      Findings were discussed with neurology provider Hollie Wyman at the time   of dictation. A callback request for the primary team was also made at   this time.  Dr. Lashell Messer discussed additional incidental findings with ELENA BRAMBILA on 2022 7:15 PM with readback.        ******PRELIMINARY REPORT******      ******PRELIMINARY REPORT******        7. Relevant blood tests:                          11.5   14.58 )-----------( 273      ( 08 May 2022 18:28 )             34.0       142  |  107  |  8<L>  ----------------------------<  88  4.0   |  20  |  0.6<L>    Ca    9.8      08 May 2022 18:28    TPro  7.1  /  Alb  4.6  /  TBili  <0.2  /  DBili  x   /  AST  34  /  ALT  23  /  AlkPhos  85  05-08  PT/INR - ( 08 May 2022 18:28 )   PT: 11.30 sec;   INR: 0.98 ratio           8. Relevant cardiac rhythm monitorin. Relevant Cardiac Structure: (TTE/ACE +/-):[ ]No intracardiac thrombus/[ ] no vegetation/[ ]no akynesia/EF:    Home Medications:      MEDICATIONS  (STANDING):      10. PT/OT/Speech/Rehab/S&Sw/ Cognitive eval results and recommendations:    11. Exam:    Vital Signs Last 24 Hrs  T(C): 36.7 (08 May 2022 19:00), Max: 36.7 (08 May 2022 18:00)  T(F): 98 (08 May 2022 19:00), Max: 98 (08 May 2022 18:00)  HR: 96 (08 May 2022 19:00) (96 - 101)  BP: 104/60 (08 May 2022 19:00) (104/60 - 121/73)  BP(mean): 91 (08 May 2022 18:00) (91 - 91)  RR: 18 (08 May 2022 19:00) (18 - 18)  SpO2: 99% (08 May 2022 19:00) (99% - 99%)    NIH Stroke Scale:   · NIH Stroke Scale: LOC	(0) Alert; keenly responsive  · NIH Stroke Scale: LOC Question	(0) Answers both questions correctly  · NIH Stroke Scale: LOC Command	(0) Performs both tasks correctly  · NIH Stroke Scale: Gaze	(0) Normal  · NIH Stroke Scale: Visual	(0) No visual loss  · NIH Stroke Scale: Facial	(0) Normal symmetrical movements  · NIH Stroke Scale: Arm Left	(0) No drift; limb holds 90 (or 45) degrees for full 10 secs  · NIH Stroke Scale: Arm Right	(0) No drift; limb holds 90 (or 45) degrees for full 10 secs  · NIH Stroke Scale: Leg Left	(0) No drift; leg holds 30 degree position for full 5 secs  · NIH Stroke Scale: Leg Right	(0) No drift; leg holds 30 degree position for full 5 secs  · NIH Stroke Scale: Ataxia	(0) Absent  · NIH Stroke Scale: Sensory	(1) Mild-to-moderate sensory loss; patient feels pinprick is less sharp or is dull on the affected side; or there is a loss of superficial pain with pinprick, but patient is aware of being touched  · NIH Stroke Scale: Language	(0) No aphasia; normal  · NIH Stroke Scale: Dysarthria	(1) Mild-to-moderate dysarthria; patient slurs at least some words and, at worst, can be understood with some difficulty  · NIH Stroke Scale: Extinct Inattention	(1) Visual, tactile, auditory, spatial, or personal inattention or extinction to bilateral simultaneous stimulation in one of the sensory modalities  · NIH Stroke Scale: Total	3  Gait: Guarded walk, no ataxia noted  Stroke CODE consult  Note:    MEAGHAN DANGELO    1. Chief Complaint:    HPI:52 y/o female with hx of seizure, current smoker, depression  recently got discharged from [facility] for new onset of seizure, not any AED)  who was brought in for AMS, ?seizure, slurred speech. Patient states her LKW- was yesterday, unknown time. Patient states that her son said she possibly had a seizure while she was sleeping where both her hands were moving up and down that possibly could be between ~2-7 AM. Patient states she did not come earlier to the hospital because of her kids. Patient is a poor historian. Reports that she noticed B/L lower extremity weakness when she woke up this morning and could not walk. Patient states she feels unsteady on her feet, but does not believe she swaying to one side. Also noted the slurred speech this morning as well. Patient was a stroke code as a pre note for dysarthria as per EMS. Patient states she had 2 seizures where she had foaming in the mouth which is why she went to [facility], does not remember the events. EMS noticed dysarthria on scene. Admits to headache, feeling confused "out of this world" experience, dizziness. Denies nausea, vomiting, tongue biting, urinary, fecal incontinence.     2. Relevant PMH:   Prior ischemic stroke/TIA[ ], Afib [ ], CAD [ ], HTN [ ], DLD [ ], DM [ ], PVD [ ], Obesity [ ],   Sedentary lifestyle [ ], CHF [ ], TORITO [ ], Cancer Hx [ ].    3. Social History: Smoking [ ], Drug Use [ ], Alcohol Use [ ], Other [ ]    4. Possible Location of Stroke:    5. Relevant Brain Tissue Imaging:  < from: CT Brain Stroke Protocol (22 @ 17:43) >  IMPRESSION:    No acute intracranial pathology.    < end of copied text >    6. Relevant Cerebrovascular Imaging:   CT Angio Neck w/ IV Cont:   ******PRELIMINARY REPORT******      ******PRELIMINARY REPORT******       ACC: [id number] EXAM:  CT ANGIO BRAIN (W)AW IC                        ACC: [id number] EXAM:  CT ANGIO NECK (W)AW IC                        ACC: [id number] EXAM:  CT PERFUSION W MAPSIC                          PROCEDURE DATE:  2022    ******PRELIMINARY REPORT******      ******PRELIMINARY REPORT******           INTERPRETATION:  Clinical History / Reason for exam: Stroke code. History   of seizure, altered mental status.    TECHNIQUE: Following the intravenous administration of 50 cc of Omnipaque   350 IV contrast, serial axial images were obtained through the brain. The   CT perfusion data set was post processed per [facility] protocol   generating color maps of CBF (cerebral blood flow), CBV (cerebral blood   volume), MTT (mean transit time), and Tmax. CT angiography of the   intracranial (head) and extracranial (neck) circulation was then   performed after initiation of an additional 70 cc of Omnipaque 350.   Multiple contiguous axial images from the skull base to vertex were   acquired. Maximal intensity projection images were additionally included   and reviewed. 3-D shaded surface reformations were made of vasculature.   3-D reformations were reviewedand included in interpretation of the   official report.    80 cc of contrast material was discarded.    Per technician the patient was unable to follow instructions due to her   medical condition and moved during the examination.    CAROTID STENOSISREFERENCE: (NASCET = 100x1-(N/D)). N=greatest narrowing.   D=normal distal diameter - MILD = <50% stenosis. - MODERATE = 50-69%   stenosis. - SEVERE = 70-89% stenosis. - HAIRLINE/CRITICAL = 90-99%   stenosis. - OCCLUDED = 100% stenosis.    COMPARISON: No direct comparison is available.    FINDINGS:    PERFUSION:    Patient motion degrades examination  CBF less than 30% volume: 0 cc  Tmax greater than 6 seconds: 33 cc  Mismatch volume: 33 cc corresponding to regions within the bilateral   cerebellar hemispheres, bilateral frontal lobes, and right temporal lobe   which do not correspond to a known vascular territory. Several of these   regions also appear to correlate with streak artifact on raw images that   is likely caused by patient motion.  Mismatch ratio: Infinite.    The CT perfusion study demonstrates no perfusion abnormality. There is an   apparent 33 cc mismatch volume within the right temporal lobe and   bilateral areas of the cerebellum and frontal lobes that is likely   artifactual.    HEAD CTA:    Internal carotid arteries demonstrate normal enhancement to the   intracranial circulation and [place].    ACAs and MCAs are patent bilaterally without evidence of vessel   occlusion, aneurysm, or vascular malformation.    Intradural vertebral arteries are patent to the vertebrobasilar   confluence. Posterior cerebral arteries demonstrate normal enhancement   and arborization without evidence of vascular occlusion, aneurysm, or   vascular malformation.    Visualized dural venous sinuses and deep cerebral venous structures   demonstrate normal enhancement without evidence of filling defect.    NECK CTA:    Three-vessel aortic arch. Origins of the arch vessels are within normal   limits.    Bilateral common carotidarteries are unremarkable. No evidence of   significant stenosis at the bilateral ICA or ECA origins. Bilateral   cervical ICAs are of normal course and caliber to the intracranial   circulation.    Origin of the right vertebral artery is obscured bystreak artifact   secondary to contrast. Origin of the left vertebral artery is within   normal limits. Bilateral vertebral arteries are normal course and caliber   to the intracranial circulation.    OTHER: Numerous right apical solid pulmonary nodules measuring up to 4   mm, multiple right apical groundglass pulmonary nodules measuring up to 5   mm (4-28, 4-68). Additional small scattered left apical nodules. Biapical   emphysematous changes.    Partially imaged bilateral breast implants with rim calcifications.    IMPRESSION:    PERFUSION:    Examination is degraded by patient motion and resulting streak artifact.    No evidence of CBF less than 30%. Apparent 33 cc mismatch volume within   the right temporal lobe and bilateral areas of thecerebellum and frontal   lobes that is likely artifactual, as above.    CTA HEAD:    No evidence of vessel occlusion or aneurysm.    CTA Neck:    No evidence of greater than 50% carotid or vertebral artery stenosis.    Numerous right apical pulmonarynodules measuring up to 5 mm   (groundglass) and 4 mm (solid), few scattered left apical nodules.   Consider nonemergent dedicated CT chest versus short-term 3 month   follow-up CT chest for further evaluation.      Findings were discussed with neurology provider Hollie Wyman at the time   of dictation. A callback request for the primary team was also made at   this time.  Dr. Lashell Messer discussed additional incidental findings with ELENA BRAMBILA on 2022 7:15 PM with readback.        ******PRELIMINARY REPORT******      ******PRELIMINARY REPORT******        7. Relevant blood tests:                          11.5   14.58 )-----------( 273      ( 08 May 2022 18:28 )             34.0   05-08    142  |  107  |  8<L>  ----------------------------<  88  4.0   |  20  |  0.6<L>    Ca    9.8      08 May 2022 18:28    TPro  7.1  /  Alb  4.6  /  TBili  <0.2  /  DBili  x   /  AST  34  /  ALT  23  /  AlkPhos  85  05-08  PT/INR - ( 08 May 2022 18:28 )   PT: 11.30 sec;   INR: 0.98 ratio           8. Relevant cardiac rhythm monitorin. Relevant Cardiac Structure: (TTE/ACE +/-):[ ]No intracardiac thrombus/[ ] no vegetation/[ ]no akynesia/EF:    Home Medications:      MEDICATIONS  (STANDING):      10. PT/OT/Speech/Rehab/S&Sw/ Cognitive eval results and recommendations:    11. Exam:    Vital Signs Last 24 Hrs  T(C): 36.7 (08 May 2022 19:00), Max: 36.7 (08 May 2022 18:00)  T(F): 98 (08 May 2022 19:00), Max: 98 (08 May 2022 18:00)  HR: 96 (08 May 2022 19:00) (96 - 101)  BP: 104/60 (08 May 2022 19:00) (104/60 - 121/73)  BP(mean): 91 (08 May 2022 18:00) (91 - 91)  RR: 18 (08 May 2022 19:00) (18 - 18)  SpO2: 99% (08 May 2022 19:00) (99% - 99%)    NIH Stroke Scale:   · NIH Stroke Scale: LOC	(0) Alert; keenly responsive  · NIH Stroke Scale: LOC Question	(0) Answers both questions correctly  · NIH Stroke Scale: LOC Command	(0) Performs both tasks correctly  · NIH Stroke Scale: Gaze	(0) Normal  · NIH Stroke Scale: Visual	(0) No visual loss  · NIH Stroke Scale: Facial	(0) Normal symmetrical movements  · NIH Stroke Scale: Arm Left	(0) No drift; limb holds 90 (or 45) degrees for full 10 secs  · NIH Stroke Scale: Arm Right	(0) No drift; limb holds 90 (or 45) degrees for full 10 secs  · NIH Stroke Scale: Leg Left	(0) No drift; leg holds 30 degree position for full 5 secs  · NIH Stroke Scale: Leg Right	(0) No drift; leg holds 30 degree position for full 5 secs  · NIH Stroke Scale: Ataxia	(0) Absent  · NIH Stroke Scale: Sensory	(1) Mild-to-moderate sensory loss; patient feels pinprick is less sharp or is dull on the affected side; or there is a loss of superficial pain with pinprick, but patient is aware of being touched  · NIH Stroke Scale: Language	(0) No aphasia; normal  · NIH Stroke Scale: Dysarthria	(1) Mild-to-moderate dysarthria; patient slurs at least some words and, at worst, can be understood with some difficulty  · NIH Stroke Scale: Extinct Inattention	(1) Visual, tactile, auditory, spatial, or personal inattention or extinction to bilateral simultaneous stimulation in one of the sensory modalities  · NIH Stroke Scale: Total	3  Gait: Guarded walk, no ataxia noted

## 2022-05-08 NOTE — CONSULT NOTE ADULT - ASSESSMENT
Assessment:52 y/o female with hx of seizure( recently got discharged from [facility] for new onset of seizure, not any AED, suppose to follow up with Dr. Finn), current smoker, depression who was brought in for AMS, ?seizure, slurred speech. Patient states her LKW- was yesterday, unknown time. Patient states that her son said she possibly had a seizure while she was sleeping where both her hands were moving up and down that possibly could be between ~2-7 AM. Patient states she did not come earlier to the hospital because of her kids. Patient is a poor historian. Reports that she noticed B/L lower extremity weakness when she woke up this morning and could not walk. Patient states she feels unsteady on her feet, but does not believe she swaying to one side. Patient was a stroke code as a pre note for dysarthria as per EMS. Patient states she had 2 seizures where she had foaming in the mouth which is why she went to [facility], does not remember the events. EMS noticed dysarthria on scene. Admits to headache, feeling confused "out of this world" experience, , dizziness. Denies nausea, vomiting, tongue biting, urinary, fecal incontinence. Does not take any blood thinners. CTH-negative for acute findings. CTA-no LVO. CTP-Apparent 33 cc mismatch volume within the right temporal lobe and bilateral areas of thecerebellum and frontal lobes that is likely artifactual. Discussed case with CTC attending, Dr. Matamoros, not a tpa candidate as LKW- out of window.     Suggestions:  Admit to medicine for toxic metabolic workup    mg x once   Plavix 300 mg x once. Continue with Plavix 75 mg x once  MRI brain without contrast   rEEG  Check b12,folate   keep Mg>2  TTE  Seizure precautions  Fall precautions     INCOMPLETE note Assessment:50 y/o female with hx of seizure( recently got discharged from [facility] for new onset of seizure, not any AED, suppose to follow up with Dr. Finn), current smoker, depression who was brought in for AMS, ?seizure, slurred speech. Patient states her LKW- was yesterday, unknown time. Patient states that her son said she possibly had a seizure while she was sleeping where both her hands were moving up and down that possibly could be between ~2-7 AM. Patient states she did not come earlier to the hospital because of her kids. Patient is a poor historian. Reports that she noticed B/L lower extremity weakness when she woke up this morning and could not walk. Patient states she feels unsteady on her feet, but does not believe she swaying to one side. Also noted the slurred speech this morning as well. Patient was a stroke code as a pre note for dysarthria as per EMS. Patient states she had 2 seizures where she had foaming in the mouth which is why she went to [facility], does not remember the events. EMS noticed dysarthria on scene. Admits to headache, feeling confused "out of this world" experience, , dizziness. Denies nausea, vomiting, tongue biting, urinary, fecal incontinence. Does not take any blood thinners. CTH-negative for acute findings. CTA-no LVO. CTP-Apparent 33 cc mismatch volume within the right temporal lobe and bilateral areas of thecerebellum and frontal lobes that is likely artifactual. Discussed case with CTC attending, Dr. Matamoros, not a tpa candidate as LKW- out of window.   Increased white count    Suggestions:  Admit to stroke unit/CEU for q4 neurochecks under Dr. Gandhi.    mg x once . Continue with ASA 81 mg tomorrow   Plavix 300 mg x once. Continue with Plavix 75 mg tomorrow   Infectious workup   MRI brain without contrast   [id number]  rEEG  Check b12,folate   keep Mg>2  TTE with bubble   Seizure precautions  Fall precautions   Hospitalist consult  a1c,tsh   PT/OT/ST    Discussed with Dr. Loja. Pending neurovascular attending note to follow  Assessment:50 y/o female with hx of seizure( recently got discharged from [facility] for new onset of seizure, not any AED, suppose to follow up with Dr. Finn), current smoker, depression who was brought in for AMS, ?seizure, slurred speech. Patient states her LKW- was yesterday, unknown time. Patient states that her son said she possibly had a seizure while she was sleeping where both her hands were moving up and down that possibly could be between ~2-7 AM. Patient states she did not come earlier to the hospital because of her kids. Patient is a poor historian. Reports that she noticed B/L lower extremity weakness when she woke up this morning and could not walk. Patient states she feels unsteady on her feet, but does not believe she swaying to one side. Also noted the slurred speech this morning as well. Patient was a stroke code as a pre note for dysarthria as per EMS. Patient states she had 2 seizures where she had foaming in the mouth which is why she went to [facility], does not remember the events. EMS noticed dysarthria on scene. Admits to headache, feeling confused "out of this world" experience, , dizziness. Denies nausea, vomiting, tongue biting, urinary, fecal incontinence. Does not take any blood thinners. CTH-negative for acute findings. CTA-no LVO. CTP-Apparent 33 cc mismatch volume within the right temporal lobe and bilateral areas of thecerebellum and frontal lobes that is likely artifactual. Discussed case with CTC attending, Dr. Matamoros, not a tpa candidate as LKW- out of window.   Increased white count    Suggestions:  Admit to stroke unit/CEU for q4 neurochecks under Dr. Gandhi.    mg x once . Continue with ASA 81 mg tomorrow   Plavix 300 mg x once. Continue with Plavix 75 mg tomorrow   Dysphagia screen   Infectious workup   MRI brain without contrast   [id number]  rEEG  Check b12,folate   keep Mg>2  TTE with bubble   Seizure precautions  Fall precautions   Hospitalist consult  a1c,tsh   PT/OT/ST    Discussed with Dr. Loja. Pending neurovascular attending note to follow

## 2022-05-08 NOTE — H&P ADULT - NSHPPHYSICALEXAM_GEN_ALL_CORE
T(C): 36.7 (05-08-22 @ 21:00), Max: 36.7 (05-08-22 @ 18:00)  HR: 107 (05-08-22 @ 21:00) (96 - 107)  BP: 132/80 (05-08-22 @ 21:00) (104/60 - 132/80)  RR: 18 (05-08-22 @ 21:00) (18 - 18)  SpO2: 99% (05-08-22 @ 21:00) (99% - 99%)    PHYSICAL EXAM:  GENERAL: NAD, well-developed  HEAD:  Atraumatic, Normocephalic  EYES: EOMI, PERRLA, conjunctiva and sclera clear  ENT:No nasal obstruction or discharge. No tonsillar exudate, swelling or erythema.  NECK: Supple, No JVD  CHEST/LUNG: Clear to auscultation bilaterally; No wheeze  HEART: Regular rate and rhythm; No murmurs, rubs, or gallops  ABDOMEN: Soft, Nontender, Nondistended; Bowel sounds present  EXTREMITIES:  2+ Peripheral Pulses, No clubbing, cyanosis, or edema  PSYCH: AAOx3  NEUROLOGY: non-focal  SKIN: No rashes or lesions T(C): 36.7 (05-08-22 @ 21:00), Max: 36.7 (05-08-22 @ 18:00)  HR: 107 (05-08-22 @ 21:00) (96 - 107)  BP: 132/80 (05-08-22 @ 21:00) (104/60 - 132/80)  RR: 18 (05-08-22 @ 21:00) (18 - 18)  SpO2: 99% (05-08-22 @ 21:00) (99% - 99%)    PHYSICAL EXAM:  GENERAL: NAD, well-developed  HEAD:  Atraumatic, Normocephalic  EYES: EOMI, PERRLA, conjunctiva and sclera clear  ENT:No nasal obstruction or discharge. No tonsillar exudate, swelling or erythema.  NECK: Supple, No JVD  CHEST/LUNG: Clear to auscultation bilaterally; + wheeze  HEART: Regular rate and rhythm; No murmurs, rubs, or gallops  ABDOMEN: Soft, Nontender, Nondistended; Bowel sounds present  EXTREMITIES:  2+ Peripheral Pulses, No clubbing, cyanosis, or edema  PSYCH: AAOx3  NEUROLOGY: non-focal  SKIN: No rashes or lesions

## 2022-05-08 NOTE — CONSULT NOTE ADULT - NS ATTEND AMEND GEN_ALL_CORE FT
Patient seen and examined and agree with above except as noted.  Patients history, notes, labs, imaging, vitals and meds reviewed personally.  Patient was seen at [facility] last week she says for 2 seizures.  Was not placed on AED.  Unclear findings on workup at that time but says she had MRI and EEG.  Says she has LE weakness and slurred speech which is not back to baseline.  Her exam is notable for b/l UE weakness proximally and b/l LE weakness proximal > distal.  No facial, VFF, EOMI.  Effort was poor on motor exam   DTR were 1+ in UE and 1+ patellar with absent ankles b/l    DDX: Seizure, Less likely stroke given b/l symptoms    Plan  1. Obtain records from [facility]  2. May need MRI brain and EEG repeated on this admission  3. Check urine drug screen, LDL, a1c  4. Remaining plan as above

## 2022-05-08 NOTE — ED ADULT NURSE REASSESSMENT NOTE - NS ED NURSE REASSESS COMMENT FT1
Patient refusing to get into hospital gown or wear red socks for fall prevention. Patient educated on the importance of these things however patient continues to refuse.

## 2022-05-09 LAB
A1C WITH ESTIMATED AVERAGE GLUCOSE RESULT: 5.6 % — SIGNIFICANT CHANGE UP (ref 4–5.6)
ANION GAP SERPL CALC-SCNC: 12 MMOL/L — SIGNIFICANT CHANGE UP (ref 7–14)
APPEARANCE UR: CLEAR — SIGNIFICANT CHANGE UP
APTT BLD: 31.2 SEC — SIGNIFICANT CHANGE UP (ref 27–39.2)
BACTERIA # UR AUTO: NEGATIVE — SIGNIFICANT CHANGE UP
BASOPHILS # BLD AUTO: 0.04 K/UL — SIGNIFICANT CHANGE UP (ref 0–0.2)
BASOPHILS NFR BLD AUTO: 0.6 % — SIGNIFICANT CHANGE UP (ref 0–1)
BILIRUB UR-MCNC: NEGATIVE — SIGNIFICANT CHANGE UP
BUN SERPL-MCNC: 6 MG/DL — LOW (ref 10–20)
CALCIUM SERPL-MCNC: 9.3 MG/DL — SIGNIFICANT CHANGE UP (ref 8.5–10.1)
CHLORIDE SERPL-SCNC: 110 MMOL/L — SIGNIFICANT CHANGE UP (ref 98–110)
CHOLEST SERPL-MCNC: 244 MG/DL — HIGH
CO2 SERPL-SCNC: 20 MMOL/L — SIGNIFICANT CHANGE UP (ref 17–32)
COLOR SPEC: YELLOW — SIGNIFICANT CHANGE UP
CREAT SERPL-MCNC: 0.5 MG/DL — LOW (ref 0.7–1.5)
DIFF PNL FLD: NEGATIVE — SIGNIFICANT CHANGE UP
EGFR: 115 ML/MIN/1.73M2 — SIGNIFICANT CHANGE UP
EOSINOPHIL # BLD AUTO: 0.4 K/UL — SIGNIFICANT CHANGE UP (ref 0–0.7)
EOSINOPHIL NFR BLD AUTO: 6 % — SIGNIFICANT CHANGE UP (ref 0–8)
EPI CELLS # UR: 2 /HPF — SIGNIFICANT CHANGE UP (ref 0–5)
ESTIMATED AVERAGE GLUCOSE: 114 MG/DL — SIGNIFICANT CHANGE UP (ref 68–114)
FOLATE SERPL-MCNC: 3.3 NG/ML — LOW
GLUCOSE SERPL-MCNC: 91 MG/DL — SIGNIFICANT CHANGE UP (ref 70–99)
GLUCOSE UR QL: NEGATIVE — SIGNIFICANT CHANGE UP
HCT VFR BLD CALC: 34.7 % — LOW (ref 37–47)
HDLC SERPL-MCNC: 27 MG/DL — LOW
HGB BLD-MCNC: 11.6 G/DL — LOW (ref 12–16)
HYALINE CASTS # UR AUTO: 1 /LPF — SIGNIFICANT CHANGE UP (ref 0–7)
IMM GRANULOCYTES NFR BLD AUTO: 0.1 % — SIGNIFICANT CHANGE UP (ref 0.1–0.3)
INR BLD: 1 RATIO — SIGNIFICANT CHANGE UP (ref 0.65–1.3)
KETONES UR-MCNC: NEGATIVE — SIGNIFICANT CHANGE UP
LEUKOCYTE ESTERASE UR-ACNC: NEGATIVE — SIGNIFICANT CHANGE UP
LIPID PNL WITH DIRECT LDL SERPL: 163 MG/DL — HIGH
LYMPHOCYTES # BLD AUTO: 3.3 K/UL — SIGNIFICANT CHANGE UP (ref 1.2–3.4)
LYMPHOCYTES # BLD AUTO: 49.4 % — SIGNIFICANT CHANGE UP (ref 20.5–51.1)
MAGNESIUM SERPL-MCNC: 2.1 MG/DL — SIGNIFICANT CHANGE UP (ref 1.8–2.4)
MCHC RBC-ENTMCNC: 33.4 G/DL — SIGNIFICANT CHANGE UP (ref 32–37)
MCHC RBC-ENTMCNC: 34.1 PG — HIGH (ref 27–31)
MCV RBC AUTO: 102.1 FL — HIGH (ref 81–99)
MONOCYTES # BLD AUTO: 0.35 K/UL — SIGNIFICANT CHANGE UP (ref 0.1–0.6)
MONOCYTES NFR BLD AUTO: 5.2 % — SIGNIFICANT CHANGE UP (ref 1.7–9.3)
NEUTROPHILS # BLD AUTO: 2.58 K/UL — SIGNIFICANT CHANGE UP (ref 1.4–6.5)
NEUTROPHILS NFR BLD AUTO: 38.7 % — LOW (ref 42.2–75.2)
NITRITE UR-MCNC: NEGATIVE — SIGNIFICANT CHANGE UP
NON HDL CHOLESTEROL: 217 MG/DL — HIGH
NRBC # BLD: 0 /100 WBCS — SIGNIFICANT CHANGE UP (ref 0–0)
PH UR: 6.5 — SIGNIFICANT CHANGE UP (ref 5–8)
PLATELET # BLD AUTO: 281 K/UL — SIGNIFICANT CHANGE UP (ref 130–400)
POTASSIUM SERPL-MCNC: 4.3 MMOL/L — SIGNIFICANT CHANGE UP (ref 3.5–5)
POTASSIUM SERPL-SCNC: 4.3 MMOL/L — SIGNIFICANT CHANGE UP (ref 3.5–5)
PROT UR-MCNC: ABNORMAL
PROTHROM AB SERPL-ACNC: 11.5 SEC — SIGNIFICANT CHANGE UP (ref 9.95–12.87)
RBC # BLD: 3.4 M/UL — LOW (ref 4.2–5.4)
RBC # FLD: 15.2 % — HIGH (ref 11.5–14.5)
RBC CASTS # UR COMP ASSIST: 5 /HPF — HIGH (ref 0–4)
SODIUM SERPL-SCNC: 142 MMOL/L — SIGNIFICANT CHANGE UP (ref 135–146)
SP GR SPEC: >1.05 (ref 1.01–1.03)
TRIGL SERPL-MCNC: 268 MG/DL — HIGH
TSH SERPL-MCNC: 1.35 UIU/ML — SIGNIFICANT CHANGE UP (ref 0.27–4.2)
UROBILINOGEN FLD QL: SIGNIFICANT CHANGE UP
VIT B12 SERPL-MCNC: 474 PG/ML — SIGNIFICANT CHANGE UP (ref 232–1245)
WBC # BLD: 6.68 K/UL — SIGNIFICANT CHANGE UP (ref 4.8–10.8)
WBC # FLD AUTO: 6.68 K/UL — SIGNIFICANT CHANGE UP (ref 4.8–10.8)
WBC UR QL: 3 /HPF — SIGNIFICANT CHANGE UP (ref 0–5)

## 2022-05-09 PROCEDURE — ZZZZZ: CPT

## 2022-05-09 PROCEDURE — 95816 EEG AWAKE AND DROWSY: CPT | Mod: 26

## 2022-05-09 PROCEDURE — 93010 ELECTROCARDIOGRAM REPORT: CPT

## 2022-05-09 PROCEDURE — 99222 1ST HOSP IP/OBS MODERATE 55: CPT

## 2022-05-09 RX ORDER — SENNA PLUS 8.6 MG/1
2 TABLET ORAL AT BEDTIME
Refills: 0 | Status: DISCONTINUED | OUTPATIENT
Start: 2022-05-09 | End: 2022-05-11

## 2022-05-09 RX ORDER — ACETAMINOPHEN 500 MG
650 TABLET ORAL EVERY 6 HOURS
Refills: 0 | Status: DISCONTINUED | OUTPATIENT
Start: 2022-05-09 | End: 2022-05-11

## 2022-05-09 RX ORDER — CLONAZEPAM 1 MG
1 TABLET ORAL ONCE
Refills: 0 | Status: DISCONTINUED | OUTPATIENT
Start: 2022-05-09 | End: 2022-05-09

## 2022-05-09 RX ORDER — NICOTINE POLACRILEX 2 MG
1 GUM BUCCAL DAILY
Refills: 0 | Status: DISCONTINUED | OUTPATIENT
Start: 2022-05-09 | End: 2022-05-11

## 2022-05-09 RX ORDER — SODIUM CHLORIDE 9 MG/ML
500 INJECTION INTRAMUSCULAR; INTRAVENOUS; SUBCUTANEOUS ONCE
Refills: 0 | Status: COMPLETED | OUTPATIENT
Start: 2022-05-09 | End: 2022-05-09

## 2022-05-09 RX ADMIN — Medication 2 MILLIGRAM(S): at 00:02

## 2022-05-09 RX ADMIN — Medication 650 MILLIGRAM(S): at 01:10

## 2022-05-09 RX ADMIN — Medication 81 MILLIGRAM(S): at 11:54

## 2022-05-09 RX ADMIN — Medication 650 MILLIGRAM(S): at 04:14

## 2022-05-09 RX ADMIN — SODIUM CHLORIDE 75 MILLILITER(S): 9 INJECTION INTRAMUSCULAR; INTRAVENOUS; SUBCUTANEOUS at 21:20

## 2022-05-09 RX ADMIN — SODIUM CHLORIDE 1000 MILLILITER(S): 9 INJECTION INTRAMUSCULAR; INTRAVENOUS; SUBCUTANEOUS at 21:21

## 2022-05-09 RX ADMIN — ENOXAPARIN SODIUM 40 MILLIGRAM(S): 100 INJECTION SUBCUTANEOUS at 21:24

## 2022-05-09 RX ADMIN — Medication 650 MILLIGRAM(S): at 19:55

## 2022-05-09 RX ADMIN — Medication 2 MILLIGRAM(S): at 11:53

## 2022-05-09 RX ADMIN — Medication 1 MILLIGRAM(S): at 19:52

## 2022-05-09 RX ADMIN — SENNA PLUS 2 TABLET(S): 8.6 TABLET ORAL at 21:22

## 2022-05-09 RX ADMIN — Medication 650 MILLIGRAM(S): at 19:37

## 2022-05-09 RX ADMIN — SODIUM CHLORIDE 75 MILLILITER(S): 9 INJECTION INTRAMUSCULAR; INTRAVENOUS; SUBCUTANEOUS at 14:05

## 2022-05-09 RX ADMIN — CLOPIDOGREL BISULFATE 75 MILLIGRAM(S): 75 TABLET, FILM COATED ORAL at 11:54

## 2022-05-09 RX ADMIN — ATORVASTATIN CALCIUM 80 MILLIGRAM(S): 80 TABLET, FILM COATED ORAL at 21:22

## 2022-05-09 RX ADMIN — QUETIAPINE FUMARATE 400 MILLIGRAM(S): 200 TABLET, FILM COATED ORAL at 21:22

## 2022-05-09 NOTE — CONSULT NOTE ADULT - ASSESSMENT
IMPRESSION: Rehab of gait dysfunction / dysarthria / r/o seizure    PRECAUTIONS: [   ] Cardiac  [   ] Respiratory  [   ] Seizures [   ] Contact Isolation  [   ] Droplet Isolation  [   ] Other    Weight Bearing Status:     RECOMMENDATION:    Out of Bed to Chair     DVT/Decubiti Prophylaxis    REHAB PLAN:     [  x  ] Bedside P/T 3-5 times a week   [  x  ]   Bedside O/T  2-3 times a week             [ x   ] Speech Therapy               [    ]  No Rehab Therapy Indicated   Conditioning/ROM                                    ADL  Bed Mobility                                               Conditioning/ROM  Transfers                                                     Bed Mobility  Sitting /Standing Balance                         Transfers                                        Gait Training                                               Sitting/Standing Balance  Stair Training [   ]Applicable                    Home equipment Eval                                                                        Splinting  [   ] Only      GOALS:   ADL   [  x  ]   Independent                    Transfers  [  x  ] Independent                          Ambulation  [ x   ] Independent     [   x  ] With device                            [    ]  CG                                                         [    ]  CG                                                                  [    ] CG                            [    ] Min A                                                   [    ] Min A                                                              [    ] Min  A          DISCHARGE PLAN:   [    ]  Good candidate for Intensive Rehabilitation/Hospital based                                             Will tolerate 3hrs Intensive Rehab Daily                                       [     ]  Short Term Rehab in Skilled Nursing Facility                                       [  x   ]  Home with Outpatient or  services                                         [     ]  Possible Candidate for Intensive Hospital based Rehab

## 2022-05-09 NOTE — PHYSICAL THERAPY INITIAL EVALUATION ADULT - TRANSFER SKILLS, REHAB EVAL
Pa was denied stating that a copy of the medical records must be submitted w/ all pa requests for migraine agents, documenting the member's medical work up for migraine, including complete problem and med lists.  If you fax to the pharmacy, we will fax in to state along w/ forms again.  Thanks Penelope [facility] pharmacy depere [phone]   independent

## 2022-05-09 NOTE — PHYSICAL THERAPY INITIAL EVALUATION ADULT - PERTINENT HX OF CURRENT PROBLEM, REHAB EVAL
50 y/o female with hx of seizure( recently got discharged from [facility] for new onset of seizure, not any AED, suppose to follow up with Dr. Finn), current smoker, bipolar disease depression who was brought in for AMS, ?seizure, slurred speech.. Pt. referred to PT for eval and tx.

## 2022-05-09 NOTE — CONSULT NOTE ADULT - SUBJECTIVE AND OBJECTIVE BOX
HPI:  52 y/o female with hx of seizure( recently got discharged from [facility] for new onset of seizure, not any AED, suppose to follow up with Dr. Finn), current smoker, bipolar disease depression who was brought in for AMS, ?seizure, slurred speech. Patient states her LKW- was yesterday, unknown time. Patient states that her son said she possibly had a seizure while she was sleeping where both her hands were moving up and down that possibly could be between ~2-7 AM. Patient states she did not come earlier to the hospital because of her kids. Patient is a poor historian. Reports that she noticed B/L lower extremity weakness when she woke up this morning and could not walk. Patient states she feels unsteady on her feet, but does not believe she swaying to one side. Also noted the slurred speech this morning as well. Patient was a stroke code as a pre note for dysarthria as per EMS. Patient states she had 2 seizures where she had foaming in the mouth which is why she went to [facility], does not remember the events. EMS noticed dysarthria on scene. Admits to headache, feeling confused "out of this world" experience, , dizziness. Denies nausea, vomiting, tongue biting, urinary, fecal incontinence. Does not take any blood thinners. CTH-negative for acute findings. CTA-no LVO. CTP-Apparent 33 cc mismatch volume within the right temporal lobe and bilateral areas of the cerebellum and frontal lobes that is likely artifactual. Neurology saw him in ED, Discussed case with CTC attending, Dr. Matamoros, not a tpa candidate as LKW- out of window.     in ED, VS were stable,  labs done showed WBC Count: 14.58 Hemoglobin: 11.5 Mean Cell Volume: 101.8  pt received Aspirin, plavix loading dose.   pt is admitted for workup/management       PAST MEDICAL & SURGICAL HISTORY:  Seizure        Hospital Course:    TODAY'S SUBJECTIVE & REVIEW OF SYMPTOMS:     Constitutional WNL   Cardio WNL   Resp WNL   GI WNL  Heme WNL  Endo WNL  Skin WNL  MSK WNL  Neuro dysarthria   Cognitive WNL  Psych WNL      MEDICATIONS  (STANDING):  aspirin enteric coated 81 milliGRAM(s) Oral daily  atorvastatin 80 milliGRAM(s) Oral at bedtime  clonazePAM  Tablet 2 milliGRAM(s) Oral daily  clopidogrel Tablet 75 milliGRAM(s) Oral daily  enoxaparin Injectable 40 milliGRAM(s) SubCutaneous every 24 hours  QUEtiapine 400 milliGRAM(s) Oral at bedtime  sodium chloride 0.9%. 1000 milliLiter(s) (75 mL/Hr) IV Continuous <Continuous>    MEDICATIONS  (PRN):  acetaminophen     Tablet .. 650 milliGRAM(s) Oral every 6 hours PRN Temp greater or equal to 38C (100.4F), Mild Pain (1 - 3)  ALBUTerol    90 MICROgram(s) HFA Inhaler 2 Puff(s) Inhalation every 6 hours PRN Shortness of Breath and/or Wheezing  LORazepam   Injectable 1 milliGRAM(s) IV Push once PRN severe anxietty      FAMILY HISTORY:      Allergies    No Known Allergies    Intolerances        SOCIAL HISTORY:    [  ] Etoh  [  ] Smoking  [  ] Substance abuse     Home Environment:  [   ] Home Alone  [  x ] Lives with Family  [   ] Home Health Aid    Dwelling:  [   ] Apartment  [ x  ] Private House  [   ] Adult Home  [   ] Skilled Nursing Facility      [   ] Short Term  [   ] Long Term  [x   ] Stairs       Elevator [   ]    FUNCTIONAL STATUS PTA: (Check all that apply)  Ambulation: [ x   ]Independent    [   ] Dependent     [   ] Non-Ambulatory  Assistive Device: [   ] SA Cane  [   ]  Q Cane  [   ] Walker  [   ]  Wheelchair  ADL : [  x ] Independent  [    ]  Dependent       Vital Signs Last 24 Hrs  T(C): 35.9 (09 May 2022 08:55), Max: 36.7 (08 May 2022 18:00)  T(F): 96.7 (09 May 2022 08:55), Max: 98 (08 May 2022 18:00)  HR: 94 (09 May 2022 08:55) (94 - 117)  BP: 125/82 (09 May 2022 08:55) (104/60 - 134/83)  BP(mean): 80 (09 May 2022 06:25) (80 - 104)  RR: 18 (09 May 2022 08:55) (18 - 19)  SpO2: 98% (09 May 2022 08:55) (96% - 99%)      PHYSICAL EXAM: Awake & Alert  GENERAL: NAD  HEAD:  Normocephalic  CHEST/LUNG: Clear   HEART: S1S2+  ABDOMEN: Soft, Nontender  EXTREMITIES:  no calf tenderness    NERVOUS SYSTEM:  Cranial Nerves 2-12 intact [ x  ] Abnormal  [   ]  ROM: WFL all extremities [  x ]  Abnormal [   ]  Motor Strength: WFL all extremities  [  x ]  Abnormal [   ]  Sensation: intact to light touch [ x  ] Abnormal [   ]    FUNCTIONAL STATUS:  Bed Mobility: Independent [   ]  Supervision [x   ]  Needs Assistance [   ]  N/A [   ]  Transfers: Independent [   ]  Supervision [ x  ]  Needs Assistance [   ]  N/A [   ]   Ambulation: Independent [   ]  Supervision [ x  ]  Needs Assistance [   ]  N/A [   ]  ADL: Independent [   ] Requires Assistance [   ] N/A [   ]      LABS:                        11.6   6.68  )-----------( 281      ( 09 May 2022 08:46 )             34.7     05-09    142  |  110  |  6<L>  ----------------------------<  91  4.3   |  20  |  0.5<L>    Ca    9.3      09 May 2022 08:46  Mg     2.1     05-09    TPro  7.1  /  Alb  4.6  /  TBili  <0.2  /  DBili  x   /  AST  34  /  ALT  23  /  AlkPhos  85  05-08    PT/INR - ( 09 May 2022 06:26 )   PT: 11.50 sec;   INR: 1.00 ratio         PTT - ( 09 May 2022 06:26 )  PTT:31.2 sec  Urinalysis Basic - ( 08 May 2022 23:53 )    Color: Yellow / Appearance: Clear / SG: >1.050 / pH: x  Gluc: x / Ketone: Negative  / Bili: Negative / Urobili: <2 mg/dL   Blood: x / Protein: 30 mg/dL / Nitrite: Negative   Leuk Esterase: Negative / RBC: 5 /HPF / WBC 3 /HPF   Sq Epi: x / Non Sq Epi: 2 /HPF / Bacteria: Negative        RADIOLOGY & ADDITIONAL STUDIES:

## 2022-05-09 NOTE — PHYSICAL THERAPY INITIAL EVALUATION ADULT - SPECIFY REASON(S)
Upon assessment pt is independent with transfers and ambulation without an Assistive Device ; will not require skilled PT at this time , may recall our service  as needed.

## 2022-05-09 NOTE — PHYSICAL THERAPY INITIAL EVALUATION ADULT - GENERAL OBSERVATIONS, REHAB EVAL
[id number] am Chart reviewed. Pt. seen out of bed ambulating independently without an Assistive Device , no deficits noted, except confused MS, assisted pt to return to bed for safety.

## 2022-05-09 NOTE — PROGRESS NOTE ADULT - ASSESSMENT
50 y/o female with hx of seizure( recently got discharged from [email or web address] for new onset of seizure, not any AED, suppose to follow up with Dr. Finn), current smoker, bipolar disease depression who was brought in for AMS, ?seizure, slurred speech. Patient states her LKW- was yesterday, unknown time. Patient states that her son said she possibly had a seizure while she was sleeping where both her hands were moving up and down that possibly could be between ~2-7 AM. Patient states she did not come earlier to the hospital because of her kids. Patient is a poor historian. Reports that she noticed B/L lower extremity weakness when she woke up this morning and could not walk. Patient states she feels unsteady on her feet, but does not believe she swaying to one side. Also noted the slurred speech this morning as well. Patient was a stroke code as a pre note for dysarthria as per EMS. Patient states she had 2 seizures where she had foaming in the mouth which is why she went to [email or web address] does not remember the events. EMS noticed dysarthria on scene. Admits to headache, feeling confused "out of this world" experience, , dizziness. Denies nausea, vomiting, tongue biting, urinary, fecal incontinence. Does not take any blood thinners.    #hx of seizure  #ro Stroke vs TIA  - CTH-negative for acute findings. CTA-no LVO. CTP-Apparent 33 cc mismatch volume within the right temporal lobe and bilateral areas of the cerebellum and frontal lobes that is likely artifactual.  -  Neurology saw him in ED, Discussed case with CTC attending, Dr. Matamoros, not a tpa candidate as LKW- out of window.   - in ED, VS were stable,  labs done showed WBC Count: 14.58 Hemoglobin: 11.5 Mean Cell Volume: 101.8  - pt received Aspirin, plavix loading dose.   - Admit to stroke unit/CEU for q4 neurochecks under Dr. Gandhi.   -  mg x once . Continue with ASA 81 mg tomorrow   - Plavix 300 mg x once. Continue with Plavix 75 mg tomorrow   - IVF  - Infectious workup : fu blood cx, ucx, CXR  - MRI brain without contrast   - [id number]  - rEEG  - keep Mg>2  - TTE with bubble   - Seizure precautions  - Fall precautions   - a1c,tsh   - PT/OT/ST    #Leucocytosis  #Macrocytic anemia  - no source of infection, WBC 14 K.   - follow up repeat CBC  - Blood cx, ucx.   - hb 11.5 .   - Check b12,folate     #Bipolar disease? depression  - conitnue Clonazepam, seroquel.     #COPD:  - not compliant with inhalers.   - not in exacerbation.   - Albuterol PRN.     Activity: As tolerated, PT/OT  Diet: NPO, IVF  Dispo: Acute  DVT ppx: Lovenox  Full code

## 2022-05-09 NOTE — CHART NOTE - NSCHARTNOTEFT_GEN_A_CORE
Addendum:  Patient demanding to leave and smoke outside. Patient reports smoking in the hospital while in ER.   I gave option of nicotine patch and patient refused.   I informed patient that if she left to smoke she could suffer stroke or seizure and it could result in death.   Also if patient was to smoke in the hospital it could result in the harm or death of herself and or other patients.

## 2022-05-09 NOTE — PHYSICAL THERAPY INITIAL EVALUATION ADULT - TINETTI GAIT TEST, REHAB EVAL
Based on this exam, pt. is low risk to falls; but due to confused MS and recent seizure activity ; remains high risk for potential fall

## 2022-05-09 NOTE — PATIENT PROFILE ADULT - FALL HARM RISK - HARM RISK INTERVENTIONS
Assistance with ambulation/Assistance OOB with selected safe patient handling equipment/Communicate Risk of Fall with Harm to all staff/Reinforce activity limits and safety measures with patient and family/Tailored Fall Risk Interventions/Use of alarms - bed, chair and/or voice tab/Visual Cue: Yellow wristband and red socks/Bed in lowest position, wheels locked, appropriate side rails in place/Call bell, personal items and telephone in reach/Instruct patient to call for assistance before getting out of bed or chair/Non-slip footwear when patient is out of bed/[place] to call system/Physically safe environment - no spills, clutter or unnecessary equipment/Purposeful Proactive Rounding/Room/bathroom lighting operational, light cord in reach

## 2022-05-10 DIAGNOSIS — F17.200 NICOTINE DEPENDENCE, UNSPECIFIED, UNCOMPLICATED: Chronic | ICD-10-CM

## 2022-05-10 DIAGNOSIS — F41.9 ANXIETY DISORDER, UNSPECIFIED: ICD-10-CM

## 2022-05-10 LAB
ALBUMIN SERPL ELPH-MCNC: 3.9 G/DL — SIGNIFICANT CHANGE UP (ref 3.5–5.2)
ALP SERPL-CCNC: 79 U/L — SIGNIFICANT CHANGE UP (ref 30–115)
ALT FLD-CCNC: 25 U/L — SIGNIFICANT CHANGE UP (ref 0–41)
AMPHET UR-MCNC: NEGATIVE — SIGNIFICANT CHANGE UP
ANION GAP SERPL CALC-SCNC: 13 MMOL/L — SIGNIFICANT CHANGE UP (ref 7–14)
AST SERPL-CCNC: 34 U/L — SIGNIFICANT CHANGE UP (ref 0–41)
BARBITURATES UR SCN-MCNC: NEGATIVE — SIGNIFICANT CHANGE UP
BENZODIAZ UR-MCNC: POSITIVE
BILIRUB SERPL-MCNC: 0.2 MG/DL — SIGNIFICANT CHANGE UP (ref 0.2–1.2)
BUN SERPL-MCNC: 6 MG/DL — LOW (ref 10–20)
CALCIUM SERPL-MCNC: 9.2 MG/DL — SIGNIFICANT CHANGE UP (ref 8.5–10.1)
CHLORIDE SERPL-SCNC: 110 MMOL/L — SIGNIFICANT CHANGE UP (ref 98–110)
CO2 SERPL-SCNC: 20 MMOL/L — SIGNIFICANT CHANGE UP (ref 17–32)
COCAINE METAB.OTHER UR-MCNC: NEGATIVE — SIGNIFICANT CHANGE UP
CREAT SERPL-MCNC: 0.5 MG/DL — LOW (ref 0.7–1.5)
CULTURE RESULTS: SIGNIFICANT CHANGE UP
EGFR: 115 ML/MIN/1.73M2 — SIGNIFICANT CHANGE UP
GLUCOSE SERPL-MCNC: 78 MG/DL — SIGNIFICANT CHANGE UP (ref 70–99)
HCT VFR BLD CALC: 34.4 % — LOW (ref 37–47)
HGB BLD-MCNC: 11.3 G/DL — LOW (ref 12–16)
MAGNESIUM SERPL-MCNC: 1.9 MG/DL — SIGNIFICANT CHANGE UP (ref 1.8–2.4)
MCHC RBC-ENTMCNC: 32.8 G/DL — SIGNIFICANT CHANGE UP (ref 32–37)
MCHC RBC-ENTMCNC: 33.7 PG — HIGH (ref 27–31)
MCV RBC AUTO: 102.7 FL — HIGH (ref 81–99)
METHADONE UR-MCNC: POSITIVE
NRBC # BLD: 0 /100 WBCS — SIGNIFICANT CHANGE UP (ref 0–0)
OPIATES UR-MCNC: NEGATIVE — SIGNIFICANT CHANGE UP
PCP SPEC-MCNC: SIGNIFICANT CHANGE UP
PHOSPHATE SERPL-MCNC: 4.8 MG/DL — SIGNIFICANT CHANGE UP (ref 2.1–4.9)
PLATELET # BLD AUTO: 278 K/UL — SIGNIFICANT CHANGE UP (ref 130–400)
POTASSIUM SERPL-MCNC: 4.1 MMOL/L — SIGNIFICANT CHANGE UP (ref 3.5–5)
POTASSIUM SERPL-SCNC: 4.1 MMOL/L — SIGNIFICANT CHANGE UP (ref 3.5–5)
PROPOXYPHENE QUALITATIVE URINE RESULT: NEGATIVE — SIGNIFICANT CHANGE UP
PROT SERPL-MCNC: 6.3 G/DL — SIGNIFICANT CHANGE UP (ref 6–8)
RBC # BLD: 3.35 M/UL — LOW (ref 4.2–5.4)
RBC # FLD: 14.8 % — HIGH (ref 11.5–14.5)
SODIUM SERPL-SCNC: 143 MMOL/L — SIGNIFICANT CHANGE UP (ref 135–146)
SPECIMEN SOURCE: SIGNIFICANT CHANGE UP
WBC # BLD: 7.71 K/UL — SIGNIFICANT CHANGE UP (ref 4.8–10.8)
WBC # FLD AUTO: 7.71 K/UL — SIGNIFICANT CHANGE UP (ref 4.8–10.8)

## 2022-05-10 PROCEDURE — 99232 SBSQ HOSP IP/OBS MODERATE 35: CPT

## 2022-05-10 PROCEDURE — 70551 MRI BRAIN STEM W/O DYE: CPT | Mod: 26

## 2022-05-10 PROCEDURE — 99223 1ST HOSP IP/OBS HIGH 75: CPT

## 2022-05-10 PROCEDURE — 70551 MRI BRAIN STEM W/O DYE: CPT | Mod: 26,77

## 2022-05-10 PROCEDURE — 99221 1ST HOSP IP/OBS SF/LOW 40: CPT

## 2022-05-10 RX ORDER — CLONAZEPAM 1 MG
2 TABLET ORAL
Refills: 0 | Status: DISCONTINUED | OUTPATIENT
Start: 2022-05-10 | End: 2022-05-10

## 2022-05-10 RX ORDER — FOLIC ACID 0.8 MG
2 TABLET ORAL DAILY
Refills: 0 | Status: DISCONTINUED | OUTPATIENT
Start: 2022-05-10 | End: 2022-05-11

## 2022-05-10 RX ORDER — POLYETHYLENE GLYCOL 3350 17 G/17G
17 POWDER, FOR SOLUTION ORAL
Refills: 0 | Status: DISCONTINUED | OUTPATIENT
Start: 2022-05-10 | End: 2022-05-11

## 2022-05-10 RX ORDER — SODIUM CHLORIDE 9 MG/ML
500 INJECTION INTRAMUSCULAR; INTRAVENOUS; SUBCUTANEOUS ONCE
Refills: 0 | Status: COMPLETED | OUTPATIENT
Start: 2022-05-10 | End: 2022-05-10

## 2022-05-10 RX ORDER — FOLIC ACID 0.8 MG
2 TABLET ORAL
Qty: 60 | Refills: 0
Start: 2022-05-10 | End: 2022-06-08

## 2022-05-10 RX ORDER — LANOLIN ALCOHOL/MO/W.PET/CERES
5 CREAM (GRAM) TOPICAL AT BEDTIME
Refills: 0 | Status: DISCONTINUED | OUTPATIENT
Start: 2022-05-10 | End: 2022-05-11

## 2022-05-10 RX ORDER — CLONAZEPAM 1 MG
2 TABLET ORAL ONCE
Refills: 0 | Status: DISCONTINUED | OUTPATIENT
Start: 2022-05-10 | End: 2022-05-10

## 2022-05-10 RX ORDER — CLONAZEPAM 1 MG
2 TABLET ORAL
Refills: 0 | Status: DISCONTINUED | OUTPATIENT
Start: 2022-05-10 | End: 2022-05-11

## 2022-05-10 RX ADMIN — CLOPIDOGREL BISULFATE 75 MILLIGRAM(S): 75 TABLET, FILM COATED ORAL at 11:27

## 2022-05-10 RX ADMIN — Medication 2 MILLIGRAM(S): at 23:27

## 2022-05-10 RX ADMIN — POLYETHYLENE GLYCOL 3350 17 GRAM(S): 17 POWDER, FOR SOLUTION ORAL at 17:25

## 2022-05-10 RX ADMIN — Medication 650 MILLIGRAM(S): at 19:10

## 2022-05-10 RX ADMIN — SODIUM CHLORIDE 1000 MILLILITER(S): 9 INJECTION INTRAMUSCULAR; INTRAVENOUS; SUBCUTANEOUS at 02:13

## 2022-05-10 RX ADMIN — ENOXAPARIN SODIUM 40 MILLIGRAM(S): 100 INJECTION SUBCUTANEOUS at 23:05

## 2022-05-10 RX ADMIN — SENNA PLUS 2 TABLET(S): 8.6 TABLET ORAL at 21:20

## 2022-05-10 RX ADMIN — QUETIAPINE FUMARATE 400 MILLIGRAM(S): 200 TABLET, FILM COATED ORAL at 23:05

## 2022-05-10 RX ADMIN — Medication 2 MILLIGRAM(S): at 11:27

## 2022-05-10 RX ADMIN — Medication 5 MILLIGRAM(S): at 23:27

## 2022-05-10 RX ADMIN — ATORVASTATIN CALCIUM 80 MILLIGRAM(S): 80 TABLET, FILM COATED ORAL at 21:20

## 2022-05-10 RX ADMIN — Medication 650 MILLIGRAM(S): at 17:37

## 2022-05-10 RX ADMIN — SODIUM CHLORIDE 75 MILLILITER(S): 9 INJECTION INTRAMUSCULAR; INTRAVENOUS; SUBCUTANEOUS at 02:49

## 2022-05-10 RX ADMIN — Medication 81 MILLIGRAM(S): at 11:27

## 2022-05-10 RX ADMIN — POLYETHYLENE GLYCOL 3350 17 GRAM(S): 17 POWDER, FOR SOLUTION ORAL at 11:26

## 2022-05-10 NOTE — OCCUPATIONAL THERAPY INITIAL EVALUATION ADULT - NS ASR FOLLOW COMMAND OT EVAL
pt requires min verbal cues of redirection to follow multi step commands/100% of the time/able to follow single-step instructions

## 2022-05-10 NOTE — OCCUPATIONAL THERAPY INITIAL EVALUATION ADULT - RUE MMT, REHAB EVAL
transport arrival for MRI/not tested due to shoulder flex/ext 3+/5, elbow flex/ext 4-/5, wrist flex/ext and grasp 3+/5 MMT

## 2022-05-10 NOTE — BH CONSULTATION LIAISON ASSESSMENT NOTE - NSBHCONSULTFOLLOWAFTERCARE_PSY_A_CORE FT
Can follow up with patient's Psychiatrist or can follow up in Psychiatry clinic [address] [place] [address]

## 2022-05-10 NOTE — CONSULT NOTE ADULT - ASSESSMENT
52 y/o female with hx of seizure( recently got discharged from [facility] for new onset of seizure, not any AED, suppose to follow up with Dr. Finn), current smoker, bipolar disease depression who was brought in for AMS, ?seizure, slurred speech. Patient states her LKW- was yesterday, unknown time. Patient states that her son said she possibly had a seizure while she was sleeping where both her hands were moving up and down that possibly could be between ~2-7 AM. Patient states she did not come earlier to the hospital because of her kids. Patient is a poor historian. Reports that she noticed B/L lower extremity weakness when she woke up this morning and could not walk. Patient states she feels unsteady on her feet, but does not believe she swaying to one side. Also noted the slurred speech this morning as well. Patient was a stroke code as a pre note for dysarthria as per EMS. Patient states she had 2 seizures where she had foaming in the mouth which is why she went to [facility], does not remember the events. EMS noticed dysarthria on scene. Admits to headache, feeling confused "out of this world" experience, , dizziness. Denies nausea, vomiting, tongue biting, urinary, fecal incontinence. Does not take any blood thinners.    #?hx of seizures  #r/o Stroke vs TIA   - CTH-negative for acute findings. CTA-no LVO. CTP-Apparent 33 cc mismatch volume within the right temporal lobe and bilateral areas of the cerebellum and frontal lobes that is likely artifactual.  - in ED, VS were stable,  labs done showed WBC Count: 14.58 Hemoglobin: 11.5 Mean Cell Volume: 101.8  - pt received Aspirin, plavix loading dose.   -  stroke unit/CEU for q4 neurochecks under Dr. Gandhi.   -  mg x once . Continue with ASA 81 mg   - Plavix 300 mg x once. Continue with Plavix 75 mg    - IVF  - Infectious workup : f/u blood cx, ucx, CXR  - MRI brain without contrast   - [id number]  - rEEG  - keep Mg>2  - TTE with bubble   - Seizure precautions  - Fall precautions   - a1c, tsh   - PT/OT/ST    #Leucocytosis  #Macrocytic anemia/Folate Deficiency  - no source of infection, WBC 14 K.   - follow up repeat CBC  - Blood cx, ucx.   - hb 11.5 .   - replete Folate w/ Folate 2mg daily    #Pulm Nodules  -outpt CT chest    #Bipolar disease/depression  - continue Clonazepam, seroquel.     #COPD/Tobacco abuse:  - not compliant with inhalers.   - not in exacerbation.   - Albuterol PRN.  - smoking cessation     Activity: As tolerated, PT/OT  Dispo: Acute  DVT ppx: Lovenox  Full code    #Progress Note Handoff  Pending: Consults____Clinical improvement and stability__x___Tests__MRI, TTE______PT____x____  Pt/Family discussion: Pt informed and agrees with the current plan  Disposition: Home__?____/SNF_______/4A______/To be determined____x____    My note supersedes the residents note should a discrepancy arise.    Chart and notes personally reviewed.  Care Discussed with Consultants/Other Providers/ Housestaff [ x] YES [ ] NO   Radiology, labs, old records personally reviewed.    discussed w/ housestaff, nursing, case management, neuro team 50 y/o female with hx of seizure( recently got discharged from [facility] for new onset of seizure, not any AED, suppose to follow up with Dr. Finn), current smoker, bipolar disease depression who was brought in for AMS, ?seizure, slurred speech. Patient states her LKW- was yesterday, unknown time. Patient states that her son said she possibly had a seizure while she was sleeping where both her hands were moving up and down that possibly could be between ~2-7 AM. Patient states she did not come earlier to the hospital because of her kids. Patient is a poor historian. Reports that she noticed B/L lower extremity weakness when she woke up this morning and could not walk. Patient states she feels unsteady on her feet, but does not believe she swaying to one side. Also noted the slurred speech this morning as well. Patient was a stroke code as a pre note for dysarthria as per EMS. Patient states she had 2 seizures where she had foaming in the mouth which is why she went to [facility], does not remember the events. EMS noticed dysarthria on scene. Admits to headache, feeling confused "out of this world" experience, , dizziness. Denies nausea, vomiting, tongue biting, urinary, fecal incontinence. Does not take any blood thinners.    #New onset seizures  #r/o Stroke vs TIA vs Encephalopathy  - CTH-negative for acute findings. CTA-no LVO. CTP-Apparent 33 cc mismatch volume within the right temporal lobe and bilateral areas of the cerebellum and frontal lobes that is likely artifactual.  - in ED, VS were stable,  labs done showed WBC Count: 14.58 Hemoglobin: 11.5 Mean Cell Volume: 101.8  - pt received Aspirin, plavix loading dose.   -  stroke unit/CEU for q4 neurochecks under Dr. Gandhi.   -  mg x once . Continue with ASA 81 mg   - Plavix 300 mg x once. Continue with Plavix 75 mg    - IVF  - Infectious workup : f/u blood cx, ucx, CXR  - MRI brain without contrast   - [id number]  - rEEG  - keep Mg>2  - TTE with bubble   - Seizure precautions  - Fall precautions   - a1c, tsh   - PT/OT/ST  -records from [facility]    #Leucocytosis  #Macrocytic anemia/Folate Deficiency  - no source of infection, WBC 14 K.   - follow up repeat CBC  - Blood cx, ucx.   - hb 11.5 .   - replete Folate w/ Folate 2mg daily    #Pulm Nodules  -outpt CT chest (pt aware and follows with pulmonologist)    #Bipolar disease/depression  - continue Clonazepam, seroquel.     #COPD/Tobacco abuse:  - not compliant with inhalers.   - not in exacerbation.   - Albuterol PRN.  - smoking cessation discussed w/ pt    Activity: As tolerated, PT/OT  Dispo: Acute  DVT ppx: Lovenox  Full code    #Progress Note Handoff  Pending: Consults____Clinical improvement and stability__x___Tests__MRI, TTE______PT____x____  Pt/Family discussion: Pt informed and agrees with the current plan  Disposition: Home__?____/SNF_______/4A______/To be determined____x____    My note supersedes the residents note should a discrepancy arise.    Chart and notes personally reviewed.  Care Discussed with Consultants/Other Providers/ Housestaff [ x] YES [ ] NO   Radiology, labs, old records personally reviewed.    discussed w/ housestaff, nursing, case management, neuro team

## 2022-05-10 NOTE — DISCHARGE NOTE PROVIDER - NSDCMRMEDTOKEN_GEN_ALL_CORE_FT
CLONAZEPAM 2 MG TABLET: 1 each orally once a day  folic acid 1 mg oral tablet: 2 tab(s) orally once a day  QUETIAPINE FUMARATE 200 MG TAB: 2 each orally once a day (at bedtime)   atorvastatin 20 mg oral tablet: 1 tab(s) orally once a day (at bedtime)  CLONAZEPAM 2 MG TABLET: 1 each orally once a day  folic acid 1 mg oral tablet: 2 tab(s) orally once a day  QUETIAPINE FUMARATE 200 MG TAB: 2 each orally once a day (at bedtime)

## 2022-05-10 NOTE — DISCHARGE NOTE PROVIDER - HOSPITAL COURSE
This 51y Female, with PMH of bipolar presented to the hospital with recent episode of abnormal jerky movements described as a seizure. The patient was admitted to [facility] where she got MR head that was not significant. On presenting to our ED she was complaining of sensory decrease on the Rt side, and slurred speech. Code stroke was called, CTH, CTA were not significant. MR head was done showed no acute stroke. Routine EEG was done and normal.     The patient underwent MR head with G+ that   vEEG was done     Problem List/Main Diagnoses (system-based):   Seizures  Inpatient treatment course:   She was started on DAPT until MR was done, then stopped. Atorvastatin was continued as her LDL was 163. Also, the patient was started on folic acid as she was found to have macrocytic anemia and low folic acid level.     New medications: atorvastatin, folic acid        On the day of discharge, the patient was seen and examined. Symptoms improved. Vital signs are stable. Labs and imaging reviewed. Patient is medically optimized and hemodynamically stable. Return precautions discussed, medication teach back done, and importance of physician followup emphasized. The patient verbalized understanding. This 51y Female, with PMH of bipolar presented to the hospital with recent episode of abnormal jerky movements described as a seizure. The patient was admitted to [facility] where she got MR head that was not significant. On presenting to our ED she was complaining of sensory decrease on the Rt side, and slurred speech. Code stroke was called, CTH, CTA were not significant. MR head was done showed no acute stroke. Routine EEG was done and normal.     The patient underwent MR head with G+ that reported with no enhancing lesions. The abnormal white matter lesions could be age related   vEEG was done and did not capture any event, and was normal.      Problem List/Main Diagnoses (system-based):   Seizures  Inpatient treatment course:   She was started on DAPT until MR was done, then stopped. Atorvastatin was continued as her LDL was 163. Also, the patient was started on folic acid as she was found to have macrocytic anemia and low folic acid level.   No need for AEDs for now, as she has two seizures within 24h with normal EEG and not significant MR head.     New medications: atorvastatin, folic acid        On the day of discharge, the patient was seen and examined. Symptoms improved. Vital signs are stable. Labs and imaging reviewed. Patient is medically optimized and hemodynamically stable. Return precautions discussed, medication teach back done, and importance of physician followup emphasized. The patient verbalized understanding. This 51y Female, with PMH of bipolar presented to the hospital with recent episode of abnormal jerky movements described as a seizure. The patient was admitted to [facility] where she got MR head that was not significant. On presenting to our ED she was complaining of sensory decrease on the Rt side, and slurred speech. Code stroke was called, CTH, CTA were not significant. MR head was done showed no acute stroke. Routine EEG was done and normal.     The patient underwent MR head with G+ that reported with no enhancing lesions. The abnormal white matter lesions could be age related   vEEG was done and did not capture any event, and was normal.      Problem List/Main Diagnoses (system-based):   Seizures  Inpatient treatment course:   She was started on DAPT until MR was done, then stopped. Atorvastatin was continued as her LDL was 163. Also, the patient was started on folic acid as she was found to have macrocytic anemia and low folic acid level.   No need for AEDs for now, as she has two seizures within 24h with normal EEG and not significant MR head.     New medications: atorvastatin, folic acid  Patient is required to have outpatient work up to r/o CNS demyelinating disease.         On the day of discharge, the patient was seen and examined. Symptoms improved. Vital signs are stable. Labs and imaging reviewed. Patient is medically optimized and hemodynamically stable. Return precautions discussed, medication teach back done, and importance of physician followup emphasized. The patient verbalized understanding.

## 2022-05-10 NOTE — BH CONSULTATION LIAISON ASSESSMENT NOTE - SUMMARY
This is a 52 yo female, lives at home with family and ex , unemployed,, has two children with admission to [facility] after a seizure (recently got discharged from [facility] for seizure, not any AED, was to follow up with Dr. Finn), current smoker 40 pack years and a past psychiatric history of bipolar depression, anxiety who was brought in for seizure evaluation and continued slurred speech. The patient takes Clonazepam 2 mg 1-2x per day as needed and takes Quetiapine 400 mg at bedtime. As per the patient, she has been seeing a Psychiatrist outpatient for many years. States she has been on her current medications Clonazepam and Quetiapine for many years and tolerated them well, and has not experiences side effects of those medications. She states that she takes them as prescribed every day and is compliant with her medications. She denies any suicidal or homicidal ideation. She states she always feels depressed and anxious, which started when she found out that her son was autistic 20 years ago.     It is unlikely that these medications have contributed to her new onset seizures at this time, and further inpatient work-up is necessary to determine the cause of her seizures.     Recommend to continue her home Psychiatry medications at this time.

## 2022-05-10 NOTE — OCCUPATIONAL THERAPY INITIAL EVALUATION ADULT - OTHER, REHAB EVAL
Pt verbalizes that her right arm "is not acting right" though pt observed using right hand during Activities of daily living. right hand dominant

## 2022-05-10 NOTE — OCCUPATIONAL THERAPY INITIAL EVALUATION ADULT - TRANSFER TRAINING, PT EVAL
Pt will perform bed<>chair and toilet transfer with distant supervision by discharge to work towards returning to OF

## 2022-05-10 NOTE — CONSULT NOTE ADULT - CONSULT REASON
seizure-like activity, medical mgmt seizure-like activity, r/o stroke, medical mgmt seizure-like activity, AMS, slurring, r/o stroke, medical mgmt

## 2022-05-10 NOTE — OCCUPATIONAL THERAPY INITIAL EVALUATION ADULT - ADL RETRAINING, OT EVAL
Pt will perform UB/LB dressing with distant supervision by discharge to work towards returning to PLOF

## 2022-05-10 NOTE — CONSULT NOTE ADULT - SUBJECTIVE AND OBJECTIVE BOX
MEAGHAN DANGELO  51y  Female    Patient is a 51y old  Female who presents with a chief complaint of seizure like activity (10 May 2022 08:58)      HPI:  52 y/o female with hx of seizure( recently got discharged from [facility] for new onset of seizure, not any AED, suppose to follow up with Dr. Finn), current smoker, bipolar disease, depression who was brought in for AMS, ?seizure, slurred speech. Patient states her LKW- was yesterday, unknown time. Patient states that her son said she possibly had a seizure while she was sleeping where both her hands were moving up and down that possibly could be between ~2-7 AM. Patient states she did not come earlier to the hospital because of her kids. Patient is a poor historian. Reports that she noticed B/L lower extremity weakness when she woke up this morning and could not walk. Patient states she feels unsteady on her feet, but does not believe she swaying to one side. Also noted the slurred speech this morning as well. Patient was a stroke code as a pre note for dysarthria as per EMS. Patient states she had 2 seizures where she had foaming in the mouth which is why she went to [facility], does not remember the events. EMS noticed dysarthria on scene. Admits to headache, feeling confused "out of this world" experience, , dizziness. Denies nausea, vomiting, tongue biting, urinary, fecal incontinence. Does not take any blood thinners. CTH-negative for acute findings. CTA-no LVO. CTP-Apparent 33 cc mismatch volume within the right temporal lobe and bilateral areas of the cerebellum and frontal lobes that is likely artifactual. Neurology saw him in ED, Discussed case with CTC attending, Dr. Matamoros, not a tpa candidate as LKW- out of window.     in ED, VS were stable,  labs done showed WBC Count: 14.58 Hemoglobin: 11.5 Mean Cell Volume: 101.8  pt received Aspirin, plavix loading dose.   pt is admitted for workup/management (08 May 2022 21:55)    S: Patient was examined and seen at bedside. This morning pt is resting comfortably in bed and reports no new issues or overnight events. No complaints, feels better  Denies CP, SOB, N/V/D/C/AP, cough, F, chills, dizziness, new focal weakness, HA, vision changes, dysuria, or urinary symptoms, blood in stool.  ROS: all other systems reviewed and are negative    PAST MEDICAL & SURGICAL HISTORY:  Seizure      SOCIAL HISTORY:  Tobacco: denies  Illicit drugs: denies  Alcohol: social  Family history reviewed and otherwise non-contributory  ALLERGIES: NKDA    MEDICATIONS  (STANDING):  aspirin enteric coated 81 milliGRAM(s) Oral daily  atorvastatin 80 milliGRAM(s) Oral at bedtime  clonazePAM  Tablet 2 milliGRAM(s) Oral daily  clopidogrel Tablet 75 milliGRAM(s) Oral daily  enoxaparin Injectable 40 milliGRAM(s) SubCutaneous every 24 hours  nicotine - 21 mG/24Hr(s) Patch 1 patch Transdermal daily  QUEtiapine 400 milliGRAM(s) Oral at bedtime  senna 2 Tablet(s) Oral at bedtime  sodium chloride 0.9%. 1000 milliLiter(s) (75 mL/Hr) IV Continuous <Continuous>    MEDICATIONS  (PRN):  acetaminophen     Tablet .. 650 milliGRAM(s) Oral every 6 hours PRN Temp greater or equal to 38C (100.4F), Mild Pain (1 - 3)  ALBUTerol    90 MICROgram(s) HFA Inhaler 2 Puff(s) Inhalation every 6 hours PRN Shortness of Breath and/or Wheezing  LORazepam   Injectable 1 milliGRAM(s) IV Push once PRN severe anxietty    Home Medications:  CLONAZEPAM 2 MG TABLET: 1 each orally once a day (08 May 2022 22:36)  QUETIAPINE FUMARATE 200 MG TAB: 2 each orally once a day (at bedtime) (08 May 2022 22:36)          Vital Signs Last 24 Hrs  T(C): 36.6 (10 May 2022 04:03), Max: 36.6 (10 May 2022 04:03)  T(F): 97.8 (10 May 2022 04:03), Max: 97.8 (10 May 2022 04:03)  HR: 94 (10 May 2022 08:03) (80 - 102)  BP: 105/70 (10 May 2022 08:03) (105/70 - 138/72)  BP(mean): 81 (10 May 2022 08:03) (81 - 102)  RR: 16 (10 May 2022 04:03) (16 - 19)  SpO2: 98% (10 May 2022 08:03) (98% - 100%)  CAPILLARY BLOOD GLUCOSE          General: NAD. Looks stated age.  HEENT: clean oropharynx, EOMI, no LAD  Neck: trachea midline, no thyromegaly  CV: nl S1 S2; no m/r/g  Resp: decreased breath sounds at base  GI: NT/ND/S +BS  MS: no clubbing/cyanosis/edema, +pulses  Neuro: motor, sensory intact; + reflexes  Skin: no rashes, nl turgor  Psychiatric: AA0x3 w/ intact insight and judgement    LABS:                        11.3   7.71  )-----------( 278      ( 10 May 2022 08:23 )             34.4     05-10    143  |  110  |  6<L>  ----------------------------<  78  4.1   |  20  |  0.5<L>    Ca    9.2      10 May 2022 08:23  Phos  4.8     05-10  Mg     1.9     05-10    TPro  6.3  /  Alb  3.9  /  TBili  0.2  /  DBili  x   /  AST  34  /  ALT  25  /  AlkPhos  79  05-10    LIVER FUNCTIONS - ( 10 May 2022 08:23 )  Alb: 3.9 g/dL / Pro: 6.3 g/dL / ALK PHOS: 79 U/L / ALT: 25 U/L / AST: 34 U/L / GGT: x           CARDIAC MARKERS ( 08 May 2022 18:28 )  x     / <0.01 ng/mL / x     / x     / x          PT/INR - ( 09 May 2022 06:26 )   PT: 11.50 sec;   INR: 1.00 ratio         PTT - ( 09 May 2022 06:26 )  PTT:31.2 sec  Urinalysis Basic - ( 08 May 2022 23:53 )    Color: Yellow / Appearance: Clear / SG: >1.050 / pH: x  Gluc: x / Ketone: Negative  / Bili: Negative / Urobili: <2 mg/dL   Blood: x / Protein: 30 mg/dL / Nitrite: Negative   Leuk Esterase: Negative / RBC: 5 /HPF / WBC 3 /HPF   Sq Epi: x / Non Sq Epi: 2 /HPF / Bacteria: Negative            EKG - nonspecific changes (my read)  Chart and consultant noted personally reviewed.  Care Discussed with Consultants/Other Providers/ Housestaff [ x] YES [ ] NO   Radiology, labs, old records personally reviewed.           MEAGHAN DANGELO  51y  Female    Patient is a 51y old  Female who presents with a chief complaint of seizure like activity (10 May 2022 08:58)      HPI:  50 y/o female with hx of seizure( recently got discharged from [facility] for new onset of seizure, not any AED, suppose to follow up with Dr. Finn), current smoker, bipolar disease, depression who was brought in for AMS, ?seizure, slurred speech. Patient states her LKW- was yesterday, unknown time. Patient states that her son said she possibly had a seizure while she was sleeping where both her hands were moving up and down that possibly could be between ~2-7 AM. Patient states she did not come earlier to the hospital because of her kids. Patient is a poor historian. Reports that she noticed B/L lower extremity weakness when she woke up this morning and could not walk. Patient states she feels unsteady on her feet, but does not believe she swaying to one side. Also noted the slurred speech this morning as well. Patient was a stroke code as a pre note for dysarthria as per EMS. Patient states she had 2 seizures where she had foaming in the mouth which is why she went to [facility], does not remember the events. EMS noticed dysarthria on scene. Admits to headache, feeling confused "out of this world" experience, , dizziness. Denies nausea, vomiting, tongue biting, urinary, fecal incontinence. Does not take any blood thinners. CTH-negative for acute findings. CTA-no LVO. CTP-Apparent 33 cc mismatch volume within the right temporal lobe and bilateral areas of the cerebellum and frontal lobes that is likely artifactual. Neurology saw him in ED, Discussed case with CTC attending, Dr. Matamoros, not a tpa candidate as LKW- out of window.     in ED, VS were stable,  labs done showed WBC Count: 14.58 Hemoglobin: 11.5 Mean Cell Volume: 101.8  pt received Aspirin, plavix loading dose.   pt is admitted for workup/management (08 May 2022 21:55)    S: Patient was examined and seen at bedside. This morning pt is resting comfortably in bed and reports no new issues or overnight events. No complaints, feels better  Denies CP, SOB, N/V/D/C/AP, cough, F, chills, dizziness, new focal weakness, HA, vision changes, dysuria, or urinary symptoms, blood in stool.  ROS: all other systems reviewed and are negative    PAST MEDICAL & SURGICAL HISTORY:  Seizure      SOCIAL HISTORY:  Tobacco: active smoker  Illicit drugs: denies  Alcohol: social  Family history reviewed and otherwise non-contributory  ALLERGIES: NKDA    MEDICATIONS  (STANDING):  aspirin enteric coated 81 milliGRAM(s) Oral daily  atorvastatin 80 milliGRAM(s) Oral at bedtime  clonazePAM  Tablet 2 milliGRAM(s) Oral daily  clopidogrel Tablet 75 milliGRAM(s) Oral daily  enoxaparin Injectable 40 milliGRAM(s) SubCutaneous every 24 hours  nicotine - 21 mG/24Hr(s) Patch 1 patch Transdermal daily  QUEtiapine 400 milliGRAM(s) Oral at bedtime  senna 2 Tablet(s) Oral at bedtime  sodium chloride 0.9%. 1000 milliLiter(s) (75 mL/Hr) IV Continuous <Continuous>    MEDICATIONS  (PRN):  acetaminophen     Tablet .. 650 milliGRAM(s) Oral every 6 hours PRN Temp greater or equal to 38C (100.4F), Mild Pain (1 - 3)  ALBUTerol    90 MICROgram(s) HFA Inhaler 2 Puff(s) Inhalation every 6 hours PRN Shortness of Breath and/or Wheezing  LORazepam   Injectable 1 milliGRAM(s) IV Push once PRN severe anxietty    Home Medications:  CLONAZEPAM 2 MG TABLET: 1 each orally once a day (08 May 2022 22:36)  QUETIAPINE FUMARATE 200 MG TAB: 2 each orally once a day (at bedtime) (08 May 2022 22:36)          Vital Signs Last 24 Hrs  T(C): 36.6 (10 May 2022 04:03), Max: 36.6 (10 May 2022 04:03)  T(F): 97.8 (10 May 2022 04:03), Max: 97.8 (10 May 2022 04:03)  HR: 94 (10 May 2022 08:03) (80 - 102)  BP: 105/70 (10 May 2022 08:03) (105/70 - 138/72)  BP(mean): 81 (10 May 2022 08:03) (81 - 102)  RR: 16 (10 May 2022 04:03) (16 - 19)  SpO2: 98% (10 May 2022 08:03) (98% - 100%)  CAPILLARY BLOOD GLUCOSE          General: NAD. Looks stated age.  HEENT: clean oropharynx, EOMI, no LAD  Neck: trachea midline, no thyromegaly  CV: nl S1 S2; no m/r/g  Resp: decreased breath sounds at base  GI: NT/ND/S +BS  MS: no clubbing/cyanosis/edema, +pulses  Neuro: motor, sensory intact; + reflexes  Skin: no rashes, nl turgor  Psychiatric: AA0x3 w/ intact insight and judgement    LABS:                        11.3   7.71  )-----------( 278      ( 10 May 2022 08:23 )             34.4     05-10    143  |  110  |  6<L>  ----------------------------<  78  4.1   |  20  |  0.5<L>    Ca    9.2      10 May 2022 08:23  Phos  4.8     05-10  Mg     1.9     05-10    TPro  6.3  /  Alb  3.9  /  TBili  0.2  /  DBili  x   /  AST  34  /  ALT  25  /  AlkPhos  79  05-10    LIVER FUNCTIONS - ( 10 May 2022 08:23 )  Alb: 3.9 g/dL / Pro: 6.3 g/dL / ALK PHOS: 79 U/L / ALT: 25 U/L / AST: 34 U/L / GGT: x           CARDIAC MARKERS ( 08 May 2022 18:28 )  x     / <0.01 ng/mL / x     / x     / x          PT/INR - ( 09 May 2022 06:26 )   PT: 11.50 sec;   INR: 1.00 ratio         PTT - ( 09 May 2022 06:26 )  PTT:31.2 sec  Urinalysis Basic - ( 08 May 2022 23:53 )    Color: Yellow / Appearance: Clear / SG: >1.050 / pH: x  Gluc: x / Ketone: Negative  / Bili: Negative / Urobili: <2 mg/dL   Blood: x / Protein: 30 mg/dL / Nitrite: Negative   Leuk Esterase: Negative / RBC: 5 /HPF / WBC 3 /HPF   Sq Epi: x / Non Sq Epi: 2 /HPF / Bacteria: Negative            EKG - nonspecific changes (my read)  Chart and consultant noted personally reviewed.  Care Discussed with Consultants/Other Providers/ Housestaff [ x] YES [ ] NO   Radiology, labs, old records personally reviewed.           MEAGHAN DANGELO  51y  Female    Patient is a 51y old  Female who presents with a chief complaint of seizure like activity (10 May 2022 08:58)      HPI:  50 y/o female with hx of seizure( recently got discharged from [facility] for new onset of seizure, not any AED, suppose to follow up with Dr. Finn), current smoker, bipolar disease, depression who was brought in for AMS, ?seizure, slurred speech. Patient states her LKW- was yesterday, unknown time. Patient states that her son said she possibly had a seizure while she was sleeping where both her hands were moving up and down that possibly could be between ~2-7 AM. Patient states she did not come earlier to the hospital because of her kids. Patient is a poor historian. Reports that she noticed B/L lower extremity weakness when she woke up this morning and could not walk. Patient states she feels unsteady on her feet, but does not believe she swaying to one side. Also noted the slurred speech this morning as well. Patient was a stroke code as a pre note for dysarthria as per EMS. Patient states she had 2 seizures where she had foaming in the mouth which is why she went to [facility], does not remember the events. EMS noticed dysarthria on scene. Admits to headache, feeling confused "out of this world" experience, , dizziness. Denies nausea, vomiting, tongue biting, urinary, fecal incontinence. Does not take any blood thinners. CTH-negative for acute findings. CTA-no LVO. CTP-Apparent 33 cc mismatch volume within the right temporal lobe and bilateral areas of the cerebellum and frontal lobes that is likely artifactual. Neurology saw him in ED, Discussed case with CTC attending, Dr. Matamoros, not a tpa candidate as LKW- out of window.     in ED, VS were stable,  labs done showed WBC Count: 14.58 Hemoglobin: 11.5 Mean Cell Volume: 101.8  pt received Aspirin, plavix loading dose.   pt is admitted for workup/management (08 May 2022 21:55)    S: Patient was examined and seen at bedside. This morning pt is resting comfortably in bed and reports no new issues or overnight events. No complaints, feels better today. Reports not missing any doses of Clonazepam. Denies EtOH use. No previous h/o seizures.    Denies CP, SOB, N/V/D/C/AP, cough, F, chills, dizziness, new focal weakness, HA, vision changes, dysuria, or urinary symptoms, blood in stool.  ROS: all other systems reviewed and are negative    PAST MEDICAL & SURGICAL HISTORY:  Seizure      SOCIAL HISTORY:  Tobacco: active smoker  Illicit drugs: denies  Alcohol: social  Family history reviewed and otherwise non-contributory  ALLERGIES: NKDA    MEDICATIONS  (STANDING):  aspirin enteric coated 81 milliGRAM(s) Oral daily  atorvastatin 80 milliGRAM(s) Oral at bedtime  clonazePAM  Tablet 2 milliGRAM(s) Oral daily  clopidogrel Tablet 75 milliGRAM(s) Oral daily  enoxaparin Injectable 40 milliGRAM(s) SubCutaneous every 24 hours  nicotine - 21 mG/24Hr(s) Patch 1 patch Transdermal daily  QUEtiapine 400 milliGRAM(s) Oral at bedtime  senna 2 Tablet(s) Oral at bedtime  sodium chloride 0.9%. 1000 milliLiter(s) (75 mL/Hr) IV Continuous <Continuous>    MEDICATIONS  (PRN):  acetaminophen     Tablet .. 650 milliGRAM(s) Oral every 6 hours PRN Temp greater or equal to 38C (100.4F), Mild Pain (1 - 3)  ALBUTerol    90 MICROgram(s) HFA Inhaler 2 Puff(s) Inhalation every 6 hours PRN Shortness of Breath and/or Wheezing  LORazepam   Injectable 1 milliGRAM(s) IV Push once PRN severe anxietty    Home Medications:  CLONAZEPAM 2 MG TABLET: 1 each orally once a day (08 May 2022 22:36)  QUETIAPINE FUMARATE 200 MG TAB: 2 each orally once a day (at bedtime) (08 May 2022 22:36)          Vital Signs Last 24 Hrs  T(C): 36.6 (10 May 2022 04:03), Max: 36.6 (10 May 2022 04:03)  T(F): 97.8 (10 May 2022 04:03), Max: 97.8 (10 May 2022 04:03)  HR: 94 (10 May 2022 08:03) (80 - 102)  BP: 105/70 (10 May 2022 08:03) (105/70 - 138/72)  BP(mean): 81 (10 May 2022 08:03) (81 - 102)  RR: 16 (10 May 2022 04:03) (16 - 19)  SpO2: 98% (10 May 2022 08:03) (98% - 100%)  CAPILLARY BLOOD GLUCOSE          General: NAD. Looks stated age.  HEENT: clean oropharynx, EOMI, no LAD  Neck: trachea midline, no thyromegaly  CV: nl S1 S2; no m/r/g  Resp: decreased breath sounds at base  GI: NT/ND/S +BS  MS: no clubbing/cyanosis/edema, +pulses  Neuro: ?RLE 5-/5, rest 5/5, ensory intact; + reflexes  Skin: no rashes, nl turgor  Psychiatric: AA0x3 w/ intact insight and judgement    tele: SR, nonspecific changes (on my own evaluation of tele monitor)    LABS:                        11.3   7.71  )-----------( 278      ( 10 May 2022 08:23 )             34.4     05-10    143  |  110  |  6<L>  ----------------------------<  78  4.1   |  20  |  0.5<L>    Ca    9.2      10 May 2022 08:23  Phos  4.8     05-10  Mg     1.9     05-10    TPro  6.3  /  Alb  3.9  /  TBili  0.2  /  DBili  x   /  AST  34  /  ALT  25  /  AlkPhos  79  05-10    LIVER FUNCTIONS - ( 10 May 2022 08:23 )  Alb: 3.9 g/dL / Pro: 6.3 g/dL / ALK PHOS: 79 U/L / ALT: 25 U/L / AST: 34 U/L / GGT: x           CARDIAC MARKERS ( 08 May 2022 18:28 )  x     / <0.01 ng/mL / x     / x     / x          PT/INR - ( 09 May 2022 06:26 )   PT: 11.50 sec;   INR: 1.00 ratio         PTT - ( 09 May 2022 06:26 )  PTT:31.2 sec  Urinalysis Basic - ( 08 May 2022 23:53 )    Color: Yellow / Appearance: Clear / SG: >1.050 / pH: x  Gluc: x / Ketone: Negative  / Bili: Negative / Urobili: <2 mg/dL   Blood: x / Protein: 30 mg/dL / Nitrite: Negative   Leuk Esterase: Negative / RBC: 5 /HPF / WBC 3 /HPF   Sq Epi: x / Non Sq Epi: 2 /HPF / Bacteria: Negative            EKG - nonspecific changes (my read)  Chart and consultant noted personally reviewed.  Care Discussed with Consultants/Other Providers/ Housestaff [ x] YES [ ] NO   Radiology, labs, old records personally reviewed.

## 2022-05-10 NOTE — OCCUPATIONAL THERAPY INITIAL EVALUATION ADULT - PAIN SENSATION, LUE, REHAB EVAL
Patient is scheduled 5/19/2021 with [facility] in the [facility] location within normal limits

## 2022-05-10 NOTE — OCCUPATIONAL THERAPY INITIAL EVALUATION ADULT - PERTINENT HX OF CURRENT PROBLEM, REHAB EVAL
50 y/o female with hx of seizure( recently got discharged from [facility] for new onset of seizure, not any AED, suppose to follow up with Dr. Finn), current smoker, bipolar disease depression who was brought in for AMS, ?seizure, slurred speech.

## 2022-05-10 NOTE — BH CONSULTATION LIAISON ASSESSMENT NOTE - NSBHCHARTREVIEWLAB_PSY_A_CORE FT
`                      12.0   7.35  )-----------( 300      ( 11 May 2022 06:49 )             36.0   05-11    141  |  107  |  13  ----------------------------<  118<H>  4.2   |  22  |  0.5<L>    Ca    9.7      11 May 2022 06:49  Phos  6.2     05-11  Mg     2.0     05-11    TPro  7.0  /  Alb  4.2  /  TBili  0.4  /  DBili  x   /  AST  37  /  ALT  30  /  AlkPhos  79  05-11

## 2022-05-10 NOTE — BH CONSULTATION LIAISON ASSESSMENT NOTE - CASE SUMMARY
51 year old female with admission to [facility] after a seizure (recently got discharged from [facility] for seizure, not any AED, was to follow up with Dr. Finn, with past psychiatric history of bipolar depression, anxiety who was brought in for seizure evaluation and continued slurred speech, seen today by psychiatry service for medication reevaluation. EEG wnl, CT head wnl, but multiple pulmonary nodules.  As per conversation with patient, she has been compliant with all of her medications. Istop is checked and it is observed she has been compliant with picking up all of her Klonopin accordingly and no abnormal behavior noted. She notes she takes all of her medications as prescribed and these episodes of seizure only started recently, despite being compliant with her medications. Antipsychotic medications are known to lower seizure threshold in the setting of an underlying cns insult, but rarely do they cause seizure  alone. Klonopin withdrawal in fact can cause seizure, it appears this patient has been persistently compliant with Klonopin that is prescribed 2mg po three times a day, but she takes about 2mg po bid; If in fact this is the case, then it is unlikely the seizure episodes are not  induced at this time. Therefore we recommend to continue this patient's home medication of klonopin 2mg po two times a day with available klonopin 2mg po  daily prn, along with seroquel 400mg po hs for now. Upon her discharge she will continue follow up with her private psychiatrist Dr. Dr. Forbes.

## 2022-05-10 NOTE — OCCUPATIONAL THERAPY INITIAL EVALUATION ADULT - TRANSFER SAFETY CONCERNS NOTED: TOILET, REHAB EVAL
decreased balance during turns/decreased safety awareness/losing balance pt observed slightly bumping into wall on right side in the bathroom/decreased balance during turns/decreased safety awareness/losing balance

## 2022-05-10 NOTE — BH CONSULTATION LIAISON ASSESSMENT NOTE - HPI (INCLUDE ILLNESS QUALITY, SEVERITY, DURATION, TIMING, CONTEXT, MODIFYING FACTORS, ASSOCIATED SIGNS AND SYMPTOMS)
This is a 52 yo female, lives at home with family and ex , unemployed,, has two children with admission to [facility] after a seizure (recently got discharged from [facility] for seizure, not any AED, was to follow up with Dr. Finn), current smoker 40 pack years and a past psychiatric history of bipolar depression, anxiety who was brought in for seizure evaluation and continued slurred speech. The patient states she was feeling well and was getting ready for bed, was down for an unknown time and was brought to [facility] initially. She states she does not remember anything about the event but does remember anything, only remembers waking up in [facility]. Patient states that her son said she possibly had a seizure while she was sleeping where both her hands were moving up and down, she was foaming at the mouth and her eyes rolled into the back of her head. Patient is a poor historian. The patient had a second seizure episode in the ED at [facility] and was sent here. She noted slurred speech ongoing since the seizure events. A Code Stroke was called at [facility] in the ED for dysarthria as per EMS. The patient admitted to headache, feeling confused, fatigued. Denies nausea, vomiting, tongue biting, urinary, fecal incontinence.     As per the patient, she has been seeing a Psychiatrist outpatient for many years, and follows once per month on televisits. States she has been on her current medications for many years including Clonazepam and Quetiapine and has not experiences side effects of those medications. She states that she takes them as prescribed every day and is compliant with her medications, stating "I would never mess with those medications". She denies any suicidal or homicidal ideation. She states she always feels depressed and anxious, which started when she found out that her son was autistic 20 years ago. This is a 52 yo female, lives at home with family and ex , unemployed,, has two children with admission to [facility] after a seizure (recently got discharged from [facility] for seizure, not any AED, was to follow up with Dr. Finn), current smoker 40 pack years and a past psychiatric history of bipolar depression, anxiety who was brought in for seizure evaluation and continued slurred speech. The patient states she was feeling well and was getting ready for bed, was down for an unknown time and was brought to [facility] initially. She states she does not remember anything about the event but does remember anything, only remembers waking up in [facility]. Patient states that her son said she possibly had a seizure while she was sleeping where both her hands were moving up and down, she was foaming at the mouth and her eyes rolled into the back of her head. Patient is a poor historian. The patient had a second seizure episode in the ED at [facility] and was sent here. She noted slurred speech ongoing since the seizure events. A Code Stroke was called at [facility] in the ED for dysarthria as per EMS. The patient admitted to headache, feeling confused, fatigued. Denies nausea, vomiting, tongue biting, urinary, fecal incontinence.     As per the patient, she has been seeing a Psychiatrist outpatient for many years, and follows once per month on televisits. States she has been on her current medications for many years including Clonazepam and Quetiapine and has not experienced side effects of those medications. She states that she takes them as prescribed every day and is compliant with her medications, stating "I would never mess with those medications". She denies any suicidal or homicidal ideation. She states she always feels depressed and anxious, which started when she found out that her son was autistic 20 years ago. Mood is described as concerned but no underlying  worsening depressive symptoms. There is no underlying psychotic symptoms currently or in the remote past.  Although there is report of bipolar depression diagnosis, there is no history of leonarda  Unsuccessful attempt made to reach out to Dr. Forbes      Istop checked, Reference #: [id number], [facility] #[id number]  04/25/2022	04/26/2022	clonazepam 2 mg tablet	90	30	Chris Forbes	[id number]	Medicaid

## 2022-05-10 NOTE — PROGRESS NOTE ADULT - ASSESSMENT
This 51y old female, with no significant PMH. She was recently admitted to [facility] for seizure and discharged with no medications. She presented to our ED because she did not feel back to her normal, and has Rt side weakness and numbness.   The patient CTH, CTP and CTA were not significant, this does not r/o stroke, as the patient is heavy smoker and her LDL came 163 which increase the risk of a stroke. The weakness and focal neurological findings could be related to post ictal phenomena; however, it has been a week since the seizure, which is unusual for post ictal findings. EEG is still pending report, and MR pending     Neuro  #Stroke workup  - continue aspirin 81mg and plavix 75mg daily  - continue atorvastatin 80mg daily  - q4hr stroke neuro checks and vitals  - obtain MRI Brain without contrast  - Stroke Code HCT Results: not significant   - Stroke Code CTA Results: not significant   - Stroke education  - Recommend stop smoking but patient refused, and she declined using the nicotine patch     # seizure:  As per the patient there was two seizures but they were within 24h, and does not fulfill the criteria to diagnose epilepsy. The decision to start treatment will depend on the MR and EEG findings at this point, unless she has another seizure.   - Ativan 2mg IV for GTCs > 2 min only  - Neurological assessment Q8hrs  - Seizure & Fall precautions  - Maintain Mg> 2 mmol/l  - Pending EEG reprt    Cards  #HTN  - Normal Bp   - obtain TTE with bubble  - Stroke Code EKG Results: NS    #HLD  - high dose statin as above in CVA  - LDL results: 163    Pulm  - call provider if SPO2 < 94%    GI  #Nutrition/Fluids/Electrolytes   - replete K<4 and Mg <2  - Diet: regular     Renal  - Daily BMP    Infectious Disease  - Stroke Code CXR results: NS    Endocrine  # HbA1c: 5.6      - TSH results: 1.35    DVT Prophylaxis  - lovenox sq for DVT prophylaxis   - SCDs for DVT prophylaxis       IDR Goals: Goals reviewed at interdisciplinary rounds with case management, social work, physical therapy, occupational therapy, and speech language pathology.   Please see specific therapy  notes for in depth goals.  Dispo: Home      Discussed daily hospital plans and goals with patient.     Discussed with Neurology Attending

## 2022-05-10 NOTE — SWALLOW BEDSIDE ASSESSMENT ADULT - SLP PERTINENT HISTORY OF CURRENT PROBLEM
52 y/o female with hx of seizure( recently got discharged from [facility] for new onset of seizure, not any AED, suppose to follow up with Dr. Finn), current smoker, bipolar disease depression who was brought in for AMS, ?seizure, slurred speech. Patient states her LKW- was yesterday, unknown time. Patient states that her son said she possibly had a seizure while she was sleeping where both her hands were moving up and down that possibly could be between ~2-7 AM. Patient states she did not come earlier to the hospital because of her kids. Patient is a poor historian. Reports that she noticed B/L lower extremity weakness when she woke up this morning Also noted the slurred speech this morning as well. Patient was a stroke code as a pre note for dysarthria as per EMS.

## 2022-05-10 NOTE — OCCUPATIONAL THERAPY INITIAL EVALUATION ADULT - RANGE OF MOTION EXAMINATION, UPPER EXTREMITY
not formally assessed at this time 2/2 eval shortened by transport to MRI. Pt observed with at least katelin shoulder flexion 0-90, elbow to digits WFL. slight increased time needed to perform RUE AROM/Left UE Active ROM was WNL (within normal limits)/Right UE Active ROM was WFL (within functional limits)

## 2022-05-10 NOTE — BH CONSULTATION LIAISON ASSESSMENT NOTE - NSBHCHARTREVIEWINVESTIGATE_PSY_A_CORE FT
< from: 12 Lead ECG (05.09.22 @ 01:39) >      Ventricular Rate 110 BPM    Atrial Rate 110 BPM    P-R Interval 116 ms    QRS Duration 80 ms    Q-T Interval 350 ms    QTC Calculation(Bazett) 473 ms    P Axis 74 degrees    R Axis 73 degrees    T Axis 68 degrees    Diagnosis Line Sinus tachycardia  Nonspecific ST abnormality  Abnormal ECG    Confirmed by Elsa Swanson MD ([id number]) on 5/10/2022 8:33:41 AM    < end of copied text >        `< from: CT Angio Head w/ IV Cont (05.08.22 @ 18:13) >    CTA HEAD:    No evidence of vessel occlusion or aneurysm.    CTA Neck:    No evidence of greater than 50% carotid or vertebral artery stenosis.    Other:    Numerous right apical pulmonary nodules measuring up to 5 mm   (groundglass) and 4 mm (solid), few scattered left apical nodules.   Dedicated CT chest is recommended for further evaluation.      < end of copied text >

## 2022-05-10 NOTE — OCCUPATIONAL THERAPY INITIAL EVALUATION ADULT - ADDITIONAL COMMENTS
Pt resides in a high ranch with 10-12 steps to main floor. +bathtub shower Pt resides in a high ranch with 10-12 steps to main floor. +bathtub shower. Pt stated that she was informed not to drive for a minimum of 1 year s/p seizure.

## 2022-05-10 NOTE — OCCUPATIONAL THERAPY INITIAL EVALUATION ADULT - PERSONAL SAFETY AND JUDGMENT, REHAB EVAL
pt observed swaying during ambulation and lightly bumped in the bathroom wall on the right side./at risk behaviors demonstrated

## 2022-05-10 NOTE — OCCUPATIONAL THERAPY INITIAL EVALUATION ADULT - TRANSFER SAFETY CONCERNS NOTED: BED/CHAIR, REHAB EVAL
decreased balance during turns/decreased safety awareness/losing balance decreased balance during turns/decreased safety awareness

## 2022-05-10 NOTE — BH CONSULTATION LIAISON ASSESSMENT NOTE - NSBHCHARTREVIEWVS_PSY_A_CORE FT
Vital Signs Last 24 Hrs  T(C): 36.6 (10 May 2022 04:03), Max: 36.6 (10 May 2022 04:03)  T(F): 97.8 (10 May 2022 04:03), Max: 97.8 (10 May 2022 04:03)  HR: 94 (10 May 2022 08:03) (80 - 102)  BP: 113/85 (10 May 2022 09:14) (105/70 - 138/72)  BP(mean): 98 (10 May 2022 09:14) (81 - 102)  RR: 16 (10 May 2022 04:03) (16 - 19)  SpO2: 98% (10 May 2022 08:03) (98% - 100%)

## 2022-05-10 NOTE — DISCHARGE NOTE PROVIDER - NSDCCPCAREPLAN_GEN_ALL_CORE_FT
PRINCIPAL DISCHARGE DIAGNOSIS  Diagnosis: Slurred speech  Assessment and Plan of Treatment:       SECONDARY DISCHARGE DIAGNOSES  Diagnosis: Seizure disorder  Assessment and Plan of Treatment:     Diagnosis: Bipolar disorder  Assessment and Plan of Treatment:      PRINCIPAL DISCHARGE DIAGNOSIS  Diagnosis: Slurred speech  Assessment and Plan of Treatment:       SECONDARY DISCHARGE DIAGNOSES  Diagnosis: Seizure disorder  Assessment and Plan of Treatment:     Diagnosis: Bipolar disorder  Assessment and Plan of Treatment:     Diagnosis: HLD (hyperlipidemia)  Assessment and Plan of Treatment: You have high cholesterol. Cholesterol is a substance that is found in the blood. Everyone has some. It is needed for good health. The problem is, people sometimes have too much cholesterol. Compared with people with normal cholesterol, people with high cholesterol have a higher risk of heart attacks, strokes, and other health problems. The higher your cholesterol, the higher your risk of these problems. It is important to take your medications for high cholesterol as prescribed to prevent the complications of this condition.  You have been prescribed a cholesterol medication call atorvastatin 80mg. This medication is to help lower your cholesterol and also helps with blood vessel healing after having a stroke      Diagnosis: Lung nodule  Assessment and Plan of Treatment: Need to follow with your pulmonologist

## 2022-05-10 NOTE — BH CONSULTATION LIAISON ASSESSMENT NOTE - RISK ASSESSMENT
Patient denies any suicidal or homicidal ideation. Patient has a good support system and several protective factors including residential stability, financial support. Patient is a low risk to herself or others at this time.

## 2022-05-10 NOTE — BH CONSULTATION LIAISON ASSESSMENT NOTE - NSBHREFERDETAILS_PSY_A_CORE_FT
Istop checked, Reference #: [id number], [facility] #[id number]  04/25/2022	04/26/2022	clonazepam 2 mg tablet	90	30	Chris Forbes	[id number]	Medicaid Istop checked, Reference #: [id number], [facility] #[id number]  04/25/2022	04/26/2022	clonazepam 2 mg tablet	90	30	Chris Forbes	[id number]	Medicaid Medication evaluation

## 2022-05-10 NOTE — BH CONSULTATION LIAISON ASSESSMENT NOTE - NSSUICPROTFACT_PSY_ALL_CORE
Responsibility to children, family, or others/Supportive social network of family or friends/Ability to cope with stress

## 2022-05-10 NOTE — OCCUPATIONAL THERAPY INITIAL EVALUATION ADULT - PATIENT/FAMILY/SIGNIFICANT OTHER GOALS STATEMENT, OT EVAL
[facility]    [address]    [place] [address]    Phone:  [phone]       Thank You for choosing us for your health care visit. We are glad to serve you and happy to provide you with this summary of your visit. Please help us to ensure we have accurate records. If you find anything that needs to be changed, please let our staff know as soon as possible.          Your Demographic Information     Patient Name Sex Kamila Coughlin Female 1953       Ethnic Group Patient Race    Not of  or  Origin White      Your Visit Details     Date & Time Provider Department    2017 5:45 PM Belle Milian MD [facility]      Your Upcoming Appointment*(Max 10)     2017  5:30 PM CDT   [facility]   [facility] ([facility])    [address]  [facility] [id number] [phone]            2017  1:45 PM CDT   Post-Op Visit with Dheeraj Vines MD   [facility] [address] ([facility] [address])    [address]  [address]  [facility] [address] [phone]              Your To Do List     Future Orders Please Complete On or Around Expires    CBC & AUTO DIFFERENTIAL      COMPREHENSIVE METABOLIC PANEL      ELECTROCARDIOGRAM 12-LEAD  2017 Dec 11, 2017    URINALYSIS & REFLEX MICRO WITH CULTURE IF INDICATED      Follow-Up    Return in about 1 year (around 2018) for Next examination.      Conditions Discussed Today or Order-Related Diagnoses        Comments    Pre-op exam    -  Primary     AVN (avascular necrosis of bone) (CMS/HCC)         Screening for diabetes mellitus (DM)         Screening, iron deficiency anemia           Your Vitals Were     BP Pulse Height Weight BMI Smoking Status    138/78 80 5' 3\" (1.6 m) 165  lb 6.4 oz (75 kg) 29.3 kg/m2 Never Smoker      Medications Prescribed or Re-Ordered Today     None      Your Current Medications Are        Disp Refills Start End    montelukast (SINGULAIR) 10 MG tablet 30 tablet 0 6/12/2017     Sig: TAKE 1 TABLET BY MOUTH EVERY NIGHT    Class: Eprescribe    clonazePAM (KLONOPIN) 1 MG tablet 90 tablet 3 5/30/2017     Sig: TAKE 1 TABLET BY MOUTH THREE TIMES DAILY    Class: Script Not Printed    venlafaxine (EFFEXOR) 75 MG tablet 60 tablet 0 5/22/2017     Sig: TAKE 1 TABLET BY MOUTH TWICE DAILY    Class: Eprescribe    QUEtiapine (SEROQUEL) 25 MG tablet 60 tablet 0 5/22/2017     Sig: TAKE 1 TABLET BY MOUTH TWICE DAILY    Class: Eprescribe    SUMAtriptan (IMITREX) 50 MG tablet 10 tablet 5 4/12/2017     Sig: Take 1 tablet by mouth at onset of migraine. May repeat after 2 hours if needed.    Class: Eprescribe    diclofenac (VOLTAREN) 1 % gel 300 g 1 2/17/2017     Sig: Apply 2-4g to L knee 3-4 times daily as needed for pain and inflammation.    Class: Eprescribe    albuterol (VENTOLIN) (2.5 MG/3ML) 0.083% nebulizer solution 150 mL 2 2/10/2017     Sig - Route: Take 3 mLs by nebulization every 6 hours as needed for Wheezing or Shortness of Breath. - Nebulization    Class: Eprescribe    fluticasone-salmeterol (ADVAIR DISKUS) 500-50 MCG/DOSE inhaler 60 each 5 2/10/2017 2/10/2018    Sig - Route: Inhale 1 puff into the lungs two times daily. Rinse mouth after use - Inhalation    Class: Eprescribe    busPIRone (BUSPAR) 15 MG tablet 60 tablet 3 1/27/2017     Sig: TAKE 1 TABLET BY MOUTH TWICE DAILY    Class: Eprescribe    fluticasone-salmeterol (ADVAIR DISKUS) 250-50 MCG/DOSE AEROSOL POWDER, BREATH ACTIVATED 1 each 11 12/30/2016 12/30/2017    Sig - Route: Inhale 1 puff into the lungs 2 times daily. - Inhalation    Class: Eprescribe    albuterol (PROAIR HFA) 108 (90 BASE) MCG/ACT inhaler 1 Inhaler 6 12/28/2016     Sig - Route: Inhale 2 puffs into the lungs every 4 hours as needed for Shortness of  Breath or Wheezing. - Inhalation    Class: Eprescribe    fluticasone (FLONASE) 50 MCG/ACT nasal spray 16 g 12 2016     Sig - Route: Spray 2 sprays in each nostril daily. - Nasal    Class: Eprescribe    Olopatadine HCl 0.2 % ophthalmic solution 10 mL 12 2016    Si drops twice a day    Class: Eprescribe    cetirizine (ZYRTEC) 10 MG tablet 30 tablet 11 2016    Sig - Route: Take 1 tablet by mouth daily. - Oral    Class: OTC    ranitidine (ZANTAC) 75 MG tablet        Sig - Route: Take 75 mg by mouth daily. - Oral    Class: Historical Med    EPINEPHrine (EPIPEN) 0.3 MG/0.3ML ESTEPHANIE auto-injector        Sig - Route: Inject 0.3 mg into the muscle as needed. - Intramuscular    Class: Historical Med    fish oil-omega-3 fatty acids 1000 MG CAPS        Sig - Route: Take 1 capsule by mouth daily. - Oral    Class: Historical Med    CALCIUM CARBONATE-VITAMIN D PO        Sig - Route: Take 1 tablet by mouth 2 times daily. - Oral    Class: Historical Med      Allergies     Amoxicillin     Gabapentin SWELLING    Penicillins HIVES, SWELLING    Sulfa Antibiotics NAUSEA      Immunizations History as of 2017     Name Date    HERPES ZOSTER SHINGLES 2016    INFLUENZA QUADRIVALENT 10/29/2014    Influenza 2016, 2015    Td:Adult type tetanus/diphtheria 2017    Tdap 2006      Problem List as of 2017     Anxiety and depression    Chronic low back pain    Leg pain, bilateral    Uterovaginal prolapse, incomplete    Pain medication agreement    Seasonal allergic rhinitis due to pollen    Asthma exacerbation, mild    Acute atopic conjunctivitis of both eyes    AVN (avascular necrosis of bone) (CMS/[facility])            Patient Instructions     None       To return to prior level of independence.

## 2022-05-10 NOTE — OCCUPATIONAL THERAPY INITIAL EVALUATION ADULT - GENERAL OBSERVATIONS, REHAB EVAL
OT eval: 08:10- OT eval: 08:10-8:30 interrupted by transport to MRI. Pt received semi harrell in bed in NAD +tele, pulse ox, BP cuff, RN Manda present.  Pt agreeable to OT eval. OT eval: 08:10-8:30; 10:45-11:10 interrupted by transport to MRI. Pt received semi harrell in bed in NAD +tele, pulse ox, BP cuff, JEROMY Holman present.  Pt agreeable to OT eval.

## 2022-05-10 NOTE — BH CONSULTATION LIAISON ASSESSMENT NOTE - CURRENT MEDICATION
MEDICATIONS  (STANDING):  aspirin enteric coated 81 milliGRAM(s) Oral daily  atorvastatin 80 milliGRAM(s) Oral at bedtime  clopidogrel Tablet 75 milliGRAM(s) Oral daily  enoxaparin Injectable 40 milliGRAM(s) SubCutaneous every 24 hours  folic acid 2 milliGRAM(s) Oral daily  nicotine - 21 mG/24Hr(s) Patch 1 patch Transdermal daily  polyethylene glycol 3350 17 Gram(s) Oral two times a day  QUEtiapine 400 milliGRAM(s) Oral at bedtime  senna 2 Tablet(s) Oral at bedtime  sodium chloride 0.9%. 1000 milliLiter(s) (75 mL/Hr) IV Continuous <Continuous>    MEDICATIONS  (PRN):  acetaminophen     Tablet .. 650 milliGRAM(s) Oral every 6 hours PRN Temp greater or equal to 38C (100.4F), Mild Pain (1 - 3)  ALBUTerol    90 MICROgram(s) HFA Inhaler 2 Puff(s) Inhalation every 6 hours PRN Shortness of Breath and/or Wheezing  clonazePAM  Tablet 2 milliGRAM(s) Oral two times a day PRN anxiety  LORazepam   Injectable 1 milliGRAM(s) IV Push once PRN severe anxietty

## 2022-05-10 NOTE — DISCHARGE NOTE PROVIDER - CARE PROVIDER_API CALL
Cesar Dc)  EEGEpilepsy; Neurology  [address] [place] [address], [address]  [place] [address]  Phone: [phone]  Fax: [phone]  Follow Up Time: 1 month

## 2022-05-10 NOTE — PROGRESS NOTE ADULT - ATTENDING COMMENTS
Patient seen and examined and agree with above except as noted.  Patients history, notes, labs, imaging, vitals and meds reviewed personally.  Patients exam not reliable.  Patients asked resident which side was weak prior to me examining her.  She was seen walking with contact guard with no obvious gait difficulties.  Her power on exam was 4-/5 throughout.  No stroke on MRI    Plan as above (VEEG and MRI with messi)

## 2022-05-10 NOTE — OCCUPATIONAL THERAPY INITIAL EVALUATION ADULT - SHORT TERM MEMORY, REHAB EVAL
pt repeats 3/3 words. Pt recalls 1 out of 3 words post 1 min and after 5 min. Verbal cues for redirection required./impaired

## 2022-05-11 VITALS
SYSTOLIC BLOOD PRESSURE: 120 MMHG | DIASTOLIC BLOOD PRESSURE: 78 MMHG | RESPIRATION RATE: 18 BRPM | HEART RATE: 106 BPM | TEMPERATURE: 97 F

## 2022-05-11 LAB
ALBUMIN SERPL ELPH-MCNC: 4.2 G/DL — SIGNIFICANT CHANGE UP (ref 3.5–5.2)
ALP SERPL-CCNC: 79 U/L — SIGNIFICANT CHANGE UP (ref 30–115)
ALT FLD-CCNC: 30 U/L — SIGNIFICANT CHANGE UP (ref 0–41)
ANION GAP SERPL CALC-SCNC: 12 MMOL/L — SIGNIFICANT CHANGE UP (ref 7–14)
AST SERPL-CCNC: 37 U/L — SIGNIFICANT CHANGE UP (ref 0–41)
BILIRUB SERPL-MCNC: 0.4 MG/DL — SIGNIFICANT CHANGE UP (ref 0.2–1.2)
BUN SERPL-MCNC: 13 MG/DL — SIGNIFICANT CHANGE UP (ref 10–20)
CALCIUM SERPL-MCNC: 9.7 MG/DL — SIGNIFICANT CHANGE UP (ref 8.5–10.1)
CHLORIDE SERPL-SCNC: 107 MMOL/L — SIGNIFICANT CHANGE UP (ref 98–110)
CO2 SERPL-SCNC: 22 MMOL/L — SIGNIFICANT CHANGE UP (ref 17–32)
CREAT SERPL-MCNC: 0.5 MG/DL — LOW (ref 0.7–1.5)
EGFR: 115 ML/MIN/1.73M2 — SIGNIFICANT CHANGE UP
GLUCOSE SERPL-MCNC: 118 MG/DL — HIGH (ref 70–99)
HCT VFR BLD CALC: 36 % — LOW (ref 37–47)
HGB BLD-MCNC: 12 G/DL — SIGNIFICANT CHANGE UP (ref 12–16)
MAGNESIUM SERPL-MCNC: 2 MG/DL — SIGNIFICANT CHANGE UP (ref 1.8–2.4)
MCHC RBC-ENTMCNC: 33.3 G/DL — SIGNIFICANT CHANGE UP (ref 32–37)
MCHC RBC-ENTMCNC: 33.8 PG — HIGH (ref 27–31)
MCV RBC AUTO: 101.4 FL — HIGH (ref 81–99)
NRBC # BLD: 0 /100 WBCS — SIGNIFICANT CHANGE UP (ref 0–0)
PHOSPHATE SERPL-MCNC: 6.2 MG/DL — HIGH (ref 2.1–4.9)
PLATELET # BLD AUTO: 300 K/UL — SIGNIFICANT CHANGE UP (ref 130–400)
POTASSIUM SERPL-MCNC: 4.2 MMOL/L — SIGNIFICANT CHANGE UP (ref 3.5–5)
POTASSIUM SERPL-SCNC: 4.2 MMOL/L — SIGNIFICANT CHANGE UP (ref 3.5–5)
PROT SERPL-MCNC: 7 G/DL — SIGNIFICANT CHANGE UP (ref 6–8)
RBC # BLD: 3.55 M/UL — LOW (ref 4.2–5.4)
RBC # FLD: 14.4 % — SIGNIFICANT CHANGE UP (ref 11.5–14.5)
SODIUM SERPL-SCNC: 141 MMOL/L — SIGNIFICANT CHANGE UP (ref 135–146)
WBC # BLD: 7.35 K/UL — SIGNIFICANT CHANGE UP (ref 4.8–10.8)
WBC # FLD AUTO: 7.35 K/UL — SIGNIFICANT CHANGE UP (ref 4.8–10.8)

## 2022-05-11 PROCEDURE — 70552 MRI BRAIN STEM W/DYE: CPT | Mod: 26

## 2022-05-11 PROCEDURE — 99232 SBSQ HOSP IP/OBS MODERATE 35: CPT

## 2022-05-11 PROCEDURE — 95720 EEG PHY/QHP EA INCR W/VEEG: CPT

## 2022-05-11 PROCEDURE — 99239 HOSP IP/OBS DSCHRG MGMT >30: CPT

## 2022-05-11 RX ORDER — ATORVASTATIN CALCIUM 80 MG/1
20 TABLET, FILM COATED ORAL AT BEDTIME
Refills: 0 | Status: DISCONTINUED | OUTPATIENT
Start: 2022-05-11 | End: 2022-05-11

## 2022-05-11 RX ORDER — FOLIC ACID 0.8 MG
2 TABLET ORAL
Qty: 60 | Refills: 0
Start: 2022-05-11 | End: 2022-06-09

## 2022-05-11 RX ORDER — ATORVASTATIN CALCIUM 80 MG/1
1 TABLET, FILM COATED ORAL
Qty: 30 | Refills: 0
Start: 2022-05-11 | End: 2022-06-09

## 2022-05-11 RX ADMIN — POLYETHYLENE GLYCOL 3350 17 GRAM(S): 17 POWDER, FOR SOLUTION ORAL at 10:04

## 2022-05-11 RX ADMIN — Medication 650 MILLIGRAM(S): at 15:39

## 2022-05-11 RX ADMIN — Medication 2 MILLIGRAM(S): at 11:44

## 2022-05-11 RX ADMIN — Medication 2 MILLIGRAM(S): at 10:04

## 2022-05-11 RX ADMIN — Medication 81 MILLIGRAM(S): at 11:44

## 2022-05-11 RX ADMIN — CLOPIDOGREL BISULFATE 75 MILLIGRAM(S): 75 TABLET, FILM COATED ORAL at 11:44

## 2022-05-11 RX ADMIN — Medication 650 MILLIGRAM(S): at 15:09

## 2022-05-11 NOTE — DISCHARGE NOTE NURSING/CASE MANAGEMENT/SOCIAL WORK - NSDCPEFALRISK_GEN_ALL_CORE
For information on Fall & Injury Prevention, visit: [email or web address] OR  [email or web address] OR  https://www.cdc.gov/steadi/patient.html

## 2022-05-11 NOTE — DISCHARGE NOTE NURSING/CASE MANAGEMENT/SOCIAL WORK - NSDPACMPNY_GEN_ALL_CORE
breakfast, Skyla Storey MD  •  [START ON 8/10/KRISHAN, Bonita Walker MD, 200 mg at 08/09/JERRY Mena  19, 4:56 PM             Traveling alone

## 2022-05-11 NOTE — PROGRESS NOTE ADULT - ASSESSMENT
50 y/o female with hx of seizure( recently got discharged from [facility] for new onset of seizure, not any AED, suppose to follow up with Dr. Finn), current smoker, bipolar disease depression who was brought in for AMS, ?seizure, slurred speech. Patient states her LKW- was yesterday, unknown time. Patient states that her son said she possibly had a seizure while she was sleeping where both her hands were moving up and down that possibly could be between ~2-7 AM. Patient states she did not come earlier to the hospital because of her kids. Patient is a poor historian. Reports that she noticed B/L lower extremity weakness when she woke up this morning and could not walk. Patient states she feels unsteady on her feet, but does not believe she swaying to one side. Also noted the slurred speech this morning as well. Patient was a stroke code as a pre note for dysarthria as per EMS. Patient states she had 2 seizures where she had foaming in the mouth which is why she went to [facility], does not remember the events. EMS noticed dysarthria on scene. Admits to headache, feeling confused "out of this world" experience, , dizziness. Denies nausea, vomiting, tongue biting, urinary, fecal incontinence. Does not take any blood thinners.    #?New onset seizures  #Abnormal MRI  - CTH-negative for acute findings. CTA-no LVO. CTP-Apparent 33 cc mismatch volume within the right temporal lobe and bilateral areas of the cerebellum and frontal lobes that is likely artifactual.  - in ED, VS were stable,  labs done showed WBC Count: 14.58 Hemoglobin: 11.5 Mean Cell Volume: 101.8  - pt received Aspirin, plavix loading dose.   - < from: MR Head No Cont (05.10.22 @ 09:10) >  Nonspecific white matter signal abnormalities possibly representing mild chronic microvascular type changes though other etiologies including demyelination, sequela of migraine headaches or chronic infection/inflammation could be considered.  < end of copied text >  - [id number]  - VEEG negative so far  - keep Mg>2   - Seizure precautions  - Fall precautions   - PT/OT/ST  -records from [facility]  - awaiting MRI brain contrast to better characterize the lesions seen on MRI noncontrast    #Leucocytosis  #Macrocytic anemia/Folate Deficiency  - no source of infection, WBC 14 K.   - follow up repeat CBC  - Blood cx, ucx.   - hb 11.5 .   - replete Folate w/ Folate 2mg daily    #Pulm Nodules  -outpt CT chest (pt aware and follows with pulmonologist)    #Bipolar disease/depression  - continue Clonazepam, seroquel.     #COPD/Tobacco abuse:  - not compliant with inhalers.   - not in exacerbation.   - Albuterol PRN.  - smoking cessation discussed w/ pt    Activity: As tolerated, PT/OT  Dispo: Acute  DVT ppx: Lovenox  Full code    #Progress Note Handoff  Pending: Consults____Clinical improvement and stability__x___Tests__MRI______PT____x____  Pt/Family discussion: Pt informed and agrees with the current plan  Disposition: Home_    My note supersedes the residents note should a discrepancy arise.    Chart and notes personally reviewed.  Care Discussed with Consultants/Other Providers/ Housestaff [ x] YES [ ] NO   Radiology, labs, old records personally reviewed.    discussed w/ housestaff, nursing, case management, neuro team

## 2022-05-11 NOTE — PROGRESS NOTE ADULT - ASSESSMENT
This 51y old female, with no significant PMH. She was recently admitted to [facility] for seizure and discharged with no medications. She presented to our ED because she did not feel back to her normal, and has Rt side weakness and numbness.   The patient CTH, CTP and CTA were not significant, this does not r/o stroke, as the patient is heavy smoker and her LDL came 163 which increase the risk of a stroke. The weakness and focal neurological findings could be related to post ictal phenomena; however, it has been a week since the seizure, which is unusual for post ictal findings. EEG is still pending report, and MR pending     Neuro  #Stroke workup  MR was done and showed not acute stroke. MR showed white matter disease, that could be related to age vs demyelinating. One of these lesions, looked suspected and required MR with contrast, which is still pending.     # seizure:  As per the patient there was two seizures but they were within 24h, and does not fulfill the criteria to diagnose epilepsy. The decision to start treatment will depend on the MR and EEG findings at this point, unless she has another seizure.    - Ativan 2mg IV for GTCs > 2 min only  - Neurological assessment Q8hrs  - Seizure & Fall precautions  - Maintain Mg> 2 mmol/l  - EEG was normal. vEEG was not completed (she went to ).     Cards  #HTN  - Normal Bp   - obtain TTE with bubble  - Stroke Code EKG Results: NS    #HLD  - Statin as above in CVA  - LDL results: 163    Pulm  - call provider if SPO2 < 94%    GI  #Nutrition/Fluids/Electrolytes   - replete K<4 and Mg <2  - Diet: regular     Renal  - Daily BMP    Infectious Disease  - Stroke Code CXR results: NS    Endocrine  # HbA1c: 5.6      - TSH results: 1.35    DVT Prophylaxis  - lovenox sq for DVT prophylaxis   - SCDs for DVT prophylaxis       IDR Goals: Goals reviewed at interdisciplinary rounds with case management, social work, physical therapy, occupational therapy, and speech language pathology.   Please see specific therapy  notes for in depth goals.  Dispo: Home      Discussed daily hospital plans and goals with patient.     Discussed with Neurology Attending This 51y old female, with no significant PMH. She was recently admitted to [facility] for seizure and discharged with no medications. She presented to our ED because she did not feel back to her normal, and has Rt side weakness and numbness.   The patient CTH, CTP and CTA were not significant, this does not r/o stroke, as the patient is heavy smoker and her LDL came 163 which increase the risk of a stroke. The weakness and focal neurological findings could be related to post ictal phenomena; however, it has been a week since the seizure, which is unusual for post ictal findings. EEG is still pending report, and MR pending     Neuro  #Stroke workup  MR was done and showed not acute stroke. MR showed white matter disease, that could be related to age vs demyelinating. One of these lesions, looked suspected and required MR with contrast, which is still pending.     # seizure:  As per the patient there was two seizures but they were within 24h, and does not fulfill the criteria to diagnose epilepsy. The decision to start treatment will depend on the MR and EEG findings at this point, unless she has another seizure.    - Ativan 2mg IV for GTCs > 2 min only  - Neurological assessment Q8hrs  - Seizure & Fall precautions  - Maintain Mg> 2 mmol/l  - EEG was normal. vEEG showed no epileptiform discharged in 8 hours recording.     Cards  #HTN  - Normal Bp   - obtain TTE with bubble  - Stroke Code EKG Results: NS    #HLD  - Statin as above in CVA  - LDL results: 163    Pulm  - call provider if SPO2 < 94%    GI  #Nutrition/Fluids/Electrolytes   - replete K<4 and Mg <2  - Diet: regular     Renal  - Daily BMP    Infectious Disease  - Stroke Code CXR results: NS    Endocrine  # HbA1c: 5.6      - TSH results: 1.35    DVT Prophylaxis  - lovenox sq for DVT prophylaxis   - SCDs for DVT prophylaxis       IDR Goals: Goals reviewed at interdisciplinary rounds with case management, social work, physical therapy, occupational therapy, and speech language pathology.   Please see specific therapy  notes for in depth goals.  Dispo: Home      Discussed daily hospital plans and goals with patient.     Discussed with Neurology Attending

## 2022-05-11 NOTE — DISCHARGE NOTE NURSING/CASE MANAGEMENT/SOCIAL WORK - PATIENT PORTAL LINK FT
You can access the FollowMyHealth Patient Portal offered by [facility] by registering at the following website: [email or web address]. By joining [employer]’s FollowMyHealth portal, you will also be able to view your health information using other applications (apps) compatible with our system.

## 2022-05-11 NOTE — PROGRESS NOTE ADULT - SUBJECTIVE AND OBJECTIVE BOX
Neurology Progress Note    Interval History: The patient was examined at the bedside. No issues this morning. It was reported overnight, that she wanted to smoke. I spoke to the son Ranjit who witnessed the seizure. The son described the event that his mother was hyperactive for a few minutes and talking continuously that was followed by jerking all her body that lasted for a few seconds. After the event, the patient was confused for a few minutes and not answering questions as per the son.    Pt denied having any previous seizures.   After the seizure the patient was moved to [facility] by ambulance, where she had another seizure ( we don't have witnesses for this seizure).     HPI:  52 y/o female with hx of seizure( recently got discharged from [facility] for new onset of seizure, not any AED, suppose to follow up with Dr. Finn), current smoker, bipolar disease depression who was brought in for AMS, ?seizure, slurred speech. Patient states her LKW- was yesterday, unknown time. Patient states that her son said she possibly had a seizure while she was sleeping where both her hands were moving up and down that possibly could be between ~2-7 AM. Patient states she did not come earlier to the hospital because of her kids. Patient is a poor historian. Reports that she noticed B/L lower extremity weakness when she woke up this morning and could not walk. Patient states she feels unsteady on her feet, but does not believe she swaying to one side. Also noted the slurred speech this morning as well. Patient was a stroke code as a pre note for dysarthria as per EMS. Patient states she had 2 seizures where she had foaming in the mouth which is why she went to [facility], does not remember the events. EMS noticed dysarthria on scene. Admits to headache, feeling confused "out of this world" experience, , dizziness. Denies nausea, vomiting, tongue biting, urinary, fecal incontinence. Does not take any blood thinners. CTH-negative for acute findings. CTA-no LVO. CTP-Apparent 33 cc mismatch volume within the right temporal lobe and bilateral areas of the cerebellum and frontal lobes that is likely artifactual. Neurology saw him in ED, Discussed case with CTC attending, Dr. Matamoros, not a tpa candidate as LKW- out of window.     in ED, VS were stable,  labs done showed WBC Count: 14.58 Hemoglobin: 11.5 Mean Cell Volume: 101.8  pt received Aspirin, plavix loading dose.   pt is admitted for workup/management (08 May 2022 21:55)      PAST MEDICAL & SURGICAL HISTORY:  Seizure            Medications:  acetaminophen     Tablet .. 650 milliGRAM(s) Oral every 6 hours PRN  ALBUTerol    90 MICROgram(s) HFA Inhaler 2 Puff(s) Inhalation every 6 hours PRN  aspirin enteric coated 81 milliGRAM(s) Oral daily  atorvastatin 80 milliGRAM(s) Oral at bedtime  clonazePAM  Tablet 2 milliGRAM(s) Oral daily  clopidogrel Tablet 75 milliGRAM(s) Oral daily  enoxaparin Injectable 40 milliGRAM(s) SubCutaneous every 24 hours  LORazepam   Injectable 1 milliGRAM(s) IV Push once PRN  nicotine - 21 mG/24Hr(s) Patch 1 patch Transdermal daily  QUEtiapine 400 milliGRAM(s) Oral at bedtime  senna 2 Tablet(s) Oral at bedtime  sodium chloride 0.9%. 1000 milliLiter(s) IV Continuous <Continuous>      Vital Signs Last 24 Hrs  T(C): 36.6 (10 May 2022 04:03), Max: 36.6 (10 May 2022 04:03)  T(F): 97.8 (10 May 2022 04:03), Max: 97.8 (10 May 2022 04:03)  HR: 94 (10 May 2022 08:03) (80 - 102)  BP: 105/70 (10 May 2022 08:03) (105/70 - 138/72)  BP(mean): 81 (10 May 2022 08:03) (81 - 102)  RR: 16 (10 May 2022 04:03) (16 - 19)  SpO2: 98% (10 May 2022 08:03) (98% - 100%)    Neurological Exam:   Mental status: Awake, alert and oriented x3.  Recent and remote memory intact.  Naming, repetition and comprehension intact.  Attention/concentration intact.  No dysarthria, no aphasia.   Cranial nerves: Pupils equally round and reactive to light, visual fields full, no nystagmus, extraocular muscles intact, V1 through V3 intact bilaterally and symmetric, face symmetric, hearing intact to finger rub, palate elevation symmetric, tongue was midline.  Motor:  MRC grading 5/5 b/l UE/LE.   strength 5/5.  Normal tone and bulk.  No abnormal movements. Mild drift of the Lt leg (however, patient was able to walk to the bathroom)    Sensation: sensory decrease on the Rt side; however, when checked sensation with fingers crossed the patient had difficulty telling the decreased side.   Coordination: No dysmetria on finger-to-nose and heel-to-shin.  No dysdiadokinesia.  Reflexes: 2+ in bilateral UE/LE, downgoing toes bilaterally. (-) Frank.  Gait: deferred    Labs:  CBC Full  -  ( 10 May 2022 08:23 )  WBC Count : 7.71 K/uL  RBC Count : 3.35 M/uL  Hemoglobin : 11.3 g/dL  Hematocrit : 34.4 %  Platelet Count - Automated : 278 K/uL  Mean Cell Volume : 102.7 fL  Mean Cell Hemoglobin : 33.7 pg  Mean Cell Hemoglobin Concentration : 32.8 g/dL  Auto Neutrophil # : x  Auto Lymphocyte # : x  Auto Monocyte # : x  Auto Eosinophil # : x  Auto Basophil # : x  Auto Neutrophil % : x  Auto Lymphocyte % : x  Auto Monocyte % : x  Auto Eosinophil % : x  Auto Basophil % : x    05-09    142  |  110  |  6<L>  ----------------------------<  91  4.3   |  20  |  0.5<L>    Ca    9.3      09 May 2022 08:46  Mg     2.1     05-09    TPro  7.1  /  Alb  4.6  /  TBili  <0.2  /  DBili  x   /  AST  34  /  ALT  23  /  AlkPhos  85  05-08    LIVER FUNCTIONS - ( 08 May 2022 18:28 )  Alb: 4.6 g/dL / Pro: 7.1 g/dL / ALK PHOS: 85 U/L / ALT: 23 U/L / AST: 34 U/L / GGT: x           PT/INR - ( 09 May 2022 06:26 )   PT: 11.50 sec;   INR: 1.00 ratio         PTT - ( 09 May 2022 06:26 )  PTT:31.2 sec  Urinalysis Basic - ( 08 May 2022 23:53 )    Color: Yellow / Appearance: Clear / SG: >1.050 / pH: x  Gluc: x / Ketone: Negative  / Bili: Negative / Urobili: <2 mg/dL   Blood: x / Protein: 30 mg/dL / Nitrite: Negative   Leuk Esterase: Negative / RBC: 5 /HPF / WBC 3 /HPF   Sq Epi: x / Non Sq Epi: 2 /HPF / Bacteria: Negative        NIHSS:    1A: LOC 0   1B: LOC Questions 0   1C: Performs Tasks 0   2: Horizontal EOMs 0  3: Visual Fields 0   4: Facial Palsy 0   5A: LUE Drift 0   5B: RUE Drift 0   6A: LLE Drift 0   6B: RLE Drift 1  7: Limb Ataxia (heel-shin, FNF) 0   8: Sensation 1  9: Language/Aphasia 0   10: Dysarthria 0   11: Extinction/Inattention 0    NIHSS Total: 2    
Patient is a 51y old  Female who presents with a chief complaint of seizure like activity (08 May 2022 21:55)      HPI:  50 y/o female with hx of seizure( recently got discharged from [facility] for new onset of seizure, not any AED, suppose to follow up with Dr. Finn), current smoker, bipolar disease depression who was brought in for AMS, ?seizure, slurred speech. Patient states her LKW- was yesterday, unknown time. Patient states that her son said she possibly had a seizure while she was sleeping where both her hands were moving up and down that possibly could be between ~2-7 AM. Patient states she did not come earlier to the hospital because of her kids. Patient is a poor historian. Reports that she noticed B/L lower extremity weakness when she woke up this morning and could not walk. Patient states she feels unsteady on her feet, but does not believe she swaying to one side. Also noted the slurred speech this morning as well. Patient was a stroke code as a pre note for dysarthria as per EMS. Patient states she had 2 seizures where she had foaming in the mouth which is why she went to [facility], does not remember the events. EMS noticed dysarthria on scene. Admits to headache, feeling confused "out of this world" experience, , dizziness. Denies nausea, vomiting, tongue biting, urinary, fecal incontinence. Does not take any blood thinners. CTH-negative for acute findings. CTA-no LVO. CTP-Apparent 33 cc mismatch volume within the right temporal lobe and bilateral areas of the cerebellum and frontal lobes that is likely artifactual. Neurology saw him in ED, Discussed case with CTC attending, Dr. Matamoros, not a tpa candidate as LKW- out of window.     in ED, VS were stable,  labs done showed WBC Count: 14.58 Hemoglobin: 11.5 Mean Cell Volume: 101.8  pt received Aspirin, plavix loading dose.   pt is admitted for workup/management (08 May 2022 21:55)      PAST MEDICAL & SURGICAL HISTORY:  Seizure        Home Medications:  CLONAZEPAM 2 MG TABLET: 1 each orally once a day (08 May 2022 22:36)  QUETIAPINE FUMARATE 200 MG TAB: 2 each orally once a day (at bedtime) (08 May 2022 22:36)      .  Tobacco use:   EtOH use:  Illicit drug use:    Living situation:    Allergies:  No Known Allergies      FAMILY HISTORY:      Hospital Medications:  acetaminophen     Tablet .. 650 milliGRAM(s) Oral every 6 hours PRN  ALBUTerol    90 MICROgram(s) HFA Inhaler 2 Puff(s) Inhalation every 6 hours PRN  aspirin enteric coated 81 milliGRAM(s) Oral daily  atorvastatin 80 milliGRAM(s) Oral at bedtime  clonazePAM  Tablet 2 milliGRAM(s) Oral daily  clopidogrel Tablet 75 milliGRAM(s) Oral daily  enoxaparin Injectable 40 milliGRAM(s) SubCutaneous every 24 hours  LORazepam   Injectable 1 milliGRAM(s) IV Push once PRN  QUEtiapine 400 milliGRAM(s) Oral at bedtime  sodium chloride 0.9%. 1000 milliLiter(s) IV Continuous <Continuous>      Vital Signs Last 24 Hrs  T(C): 35.9 (09 May 2022 08:55), Max: 36.7 (08 May 2022 18:00)  T(F): 96.7 (09 May 2022 08:55), Max: 98 (08 May 2022 18:00)  HR: 94 (09 May 2022 08:55) (94 - 117)  BP: 125/82 (09 May 2022 08:55) (104/60 - 134/83)  BP(mean): 80 (09 May 2022 06:25) (80 - 104)  RR: 18 (09 May 2022 08:55) (18 - 19)  SpO2: 98% (09 May 2022 08:55) (96% - 99%)    I&O's Summary      LABS:                        11.6   6.68  )-----------( 281      ( 09 May 2022 08:46 )             34.7       05-08    142  |  107  |  8<L>  ----------------------------<  88  4.0   |  20  |  0.6<L>    Ca    9.8      08 May 2022 18:28    TPro  7.1  /  Alb  4.6  /  TBili  <0.2  /  DBili  x   /  AST  34  /  ALT  23  /  AlkPhos  85  05-08      CARDIAC MARKERS ( 08 May 2022 18:28 )  x     / <0.01 ng/mL / x     / x     / x                PHYSICAL EXAM:  GENERAL: NAD, lying in bed comfortably  HEAD:  Atraumatic, Normocephalic  EYES: EOMI, conjunctiva and sclera clear  NECK: Supple, No JVD  CHEST/LUNG: Clear to auscultation bilaterally; No rales, rhonchi, wheezing, or rubs.   HEART: Regular rate and rhythm; No murmurs, rubs, or gallops  ABDOMEN: Bowel sounds present; Soft, Nontender, Nondistended.   EXTREMITIES: No clubbing, cyanosis, or edema  NERVOUS SYSTEM:  Alert & Oriented X3, speech clear. No deficits             
Neurology Progress Note    Interval History: The patient was examined at the bedside. No issues this morning. It was reported overnight, that she wanted to smoke.        HPI:  50 y/o female with hx of seizure( recently got discharged from [facility] for new onset of seizure, not any AED, suppose to follow up with Dr. Finn), current smoker, bipolar disease depression who was brought in for AMS, ?seizure, slurred speech. Patient states her LKW- was yesterday, unknown time. Patient states that her son said she possibly had a seizure while she was sleeping where both her hands were moving up and down that possibly could be between ~2-7 AM. Patient states she did not come earlier to the hospital because of her kids. Patient is a poor historian. Reports that she noticed B/L lower extremity weakness when she woke up this morning and could not walk. Patient states she feels unsteady on her feet, but does not believe she swaying to one side. Also noted the slurred speech this morning as well. Patient was a stroke code as a pre note for dysarthria as per EMS. Patient states she had 2 seizures where she had foaming in the mouth which is why she went to [facility], does not remember the events. EMS noticed dysarthria on scene. Admits to headache, feeling confused "out of this world" experience, , dizziness. Denies nausea, vomiting, tongue biting, urinary, fecal incontinence. Does not take any blood thinners. CTH-negative for acute findings. CTA-no LVO. CTP-Apparent 33 cc mismatch volume within the right temporal lobe and bilateral areas of the cerebellum and frontal lobes that is likely artifactual. Neurology saw him in ED, Discussed case with CTC attending, Dr. Matamoros, not a tpa candidate as LKW- out of window.     in ED, VS were stable,  labs done showed WBC Count: 14.58 Hemoglobin: 11.5 Mean Cell Volume: 101.8  pt received Aspirin, plavix loading dose.   pt is admitted for workup/management (08 May 2022 21:55)      PAST MEDICAL & SURGICAL HISTORY:  Seizure            MEDICATIONS  (STANDING):  aspirin enteric coated 81 milliGRAM(s) Oral daily  atorvastatin 80 milliGRAM(s) Oral at bedtime  clopidogrel Tablet 75 milliGRAM(s) Oral daily  enoxaparin Injectable 40 milliGRAM(s) SubCutaneous every 24 hours  folic acid 2 milliGRAM(s) Oral daily  melatonin 5 milliGRAM(s) Oral at bedtime  nicotine - 21 mG/24Hr(s) Patch 1 patch Transdermal daily  polyethylene glycol 3350 17 Gram(s) Oral two times a day  QUEtiapine 400 milliGRAM(s) Oral at bedtime  senna 2 Tablet(s) Oral at bedtime  sodium chloride 0.9%. 1000 milliLiter(s) (75 mL/Hr) IV Continuous <Continuous>    MEDICATIONS  (PRN):  acetaminophen     Tablet .. 650 milliGRAM(s) Oral every 6 hours PRN Temp greater or equal to 38C (100.4F), Mild Pain (1 - 3)  ALBUTerol    90 MICROgram(s) HFA Inhaler 2 Puff(s) Inhalation every 6 hours PRN Shortness of Breath and/or Wheezing  clonazePAM  Tablet 2 milliGRAM(s) Oral two times a day PRN anxiety  LORazepam   Injectable 1 milliGRAM(s) IV Push once PRN severe anxietty      Vital Signs Last 24 Hrs  T(C): 35.6 (11 May 2022 05:13), Max: 36.3 (10 May 2022 19:31)  T(F): 96 (11 May 2022 05:13), Max: 97.4 (10 May 2022 19:31)  HR: 101 (11 May 2022 05:13) (84 - 101)  BP: 118/81 (11 May 2022 05:13) (114/71 - 129/63)  BP(mean): 95 (11 May 2022 05:13) (92 - 100)  RR: 18 (11 May 2022 05:13) (18 - 18)  SpO2: --    Neurological Exam:   Mental status: Awake, alert and oriented x3.  Recent and remote memory intact.  Naming, repetition and comprehension intact.  Attention/concentration intact.  No dysarthria, no aphasia.   Cranial nerves: Pupils equally round and reactive to light, visual fields full, no nystagmus, extraocular muscles intact, V1 through V3 intact bilaterally and symmetric, face symmetric, hearing intact to finger rub, palate elevation symmetric, tongue was midline.  Motor:  MRC grading 5/5 b/l UE/LE.   strength 5/5.  Normal tone and bulk.  No abnormal movements. Mild drift of the Lt leg (however, patient was able to walk to the bathroom)    Sensation: sensory decrease on the Rt side; however, when checked sensation with fingers crossed the patient had difficulty telling the decreased side.   Coordination: No dysmetria on finger-to-nose and heel-to-shin.  No dysdiadokinesia.  Reflexes: 2+ in bilateral UE/LE, downgoing toes bilaterally. (-) Frank.  Gait: narrow base normal gait.       LABS:  cret                        12.0   7.35  )-----------( 300      ( 11 May 2022 06:49 )             36.0     05-11    141  |  107  |  13  ----------------------------<  118<H>  4.2   |  22  |  0.5<L>    Ca    9.7      11 May 2022 06:49  Phos  6.2     05-11  Mg     2.0     05-11    TPro  7.0  /  Alb  4.2  /  TBili  0.4  /  DBili  x   /  AST  37  /  ALT  30  /  AlkPhos  79  05-11          
MEAGHAN DANGELO  51y  Female    Patient is a 51y old  Female who presents with a chief complaint of seizure like activity (10 May 2022 08:58)      HPI:  50 y/o female with hx of seizure( recently got discharged from [facility] [place] for new onset of seizure, not any AED, suppose to follow up with Dr. Finn), current smoker, bipolar disease, depression who was brought in for AMS, ?seizure, slurred speech. Patient states her LKW- was yesterday, unknown time. Patient states that her son said she possibly had a seizure while she was sleeping where both her hands were moving up and down that possibly could be between ~2-7 AM. Patient states she did not come earlier to the hospital because of her kids. Patient is a poor historian. Reports that she noticed B/L lower extremity weakness when she woke up this morning and could not walk. Patient states she feels unsteady on her feet, but does not believe she swaying to one side. Also noted the slurred speech this morning as well. Patient was a stroke code as a pre note for dysarthria as per EMS. Patient states she had 2 seizures where she had foaming in the mouth which is why she went to [facility] [place], does not remember the events. EMS noticed dysarthria on scene. Admits to headache, feeling confused "out of this world" experience, , dizziness. Denies nausea, vomiting, tongue biting, urinary, fecal incontinence. Does not take any blood thinners. CTH-negative for acute findings. CTA-no LVO. CTP-Apparent 33 cc mismatch volume within the right temporal lobe and bilateral areas of the cerebellum and frontal lobes that is likely artifactual. Neurology saw him in ED, Discussed case with CTC attending, Dr. Matamoros, not a tpa candidate as LKW- out of window.     in ED, VS were stable,  labs done showed WBC Count: 14.58 Hemoglobin: 11.5 Mean Cell Volume: 101.8  pt received Aspirin, plavix loading dose.   pt is admitted for workup/management (08 May 2022 21:55)    S: Patient was examined and seen at bedside. This morning pt is resting comfortably in bed and reports no new issues or overnight events. No complaints, feels better today. No events    Denies CP, SOB, N/V/D/C/AP, cough, F, chills, dizziness, new focal weakness, HA, vision changes, dysuria, or urinary symptoms, blood in stool.  ROS: all other systems reviewed and are negative    PAST MEDICAL & SURGICAL HISTORY:  Seizure      SOCIAL HISTORY:  Tobacco: active smoker  Illicit drugs: denies  Alcohol: social  Family history reviewed and otherwise non-contributory  ALLERGIES: NKDA      General: NAD. Looks stated age.  HEENT: clean oropharynx, EOMI, no LAD  Neck: trachea midline, no thyromegaly  CV: nl S1 S2; no m/r/g  Resp: decreased breath sounds at base  GI: NT/ND/S +BS  MS: no clubbing/cyanosis/edema, +pulses  Neuro: ?RLE 5-/5, rest 5/5, ensory intact; + reflexes  Skin: no rashes, nl turgor  Psychiatric: AA0x3 w/ intact insight and judgement            MEDICATIONS  (STANDING):  atorvastatin 20 milliGRAM(s) Oral at bedtime  enoxaparin Injectable 40 milliGRAM(s) SubCutaneous every 24 hours  folic acid 2 milliGRAM(s) Oral daily  melatonin 5 milliGRAM(s) Oral at bedtime  nicotine - 21 mG/24Hr(s) Patch 1 patch Transdermal daily  polyethylene glycol 3350 17 Gram(s) Oral two times a day  QUEtiapine 400 milliGRAM(s) Oral at bedtime  senna 2 Tablet(s) Oral at bedtime  sodium chloride 0.9%. 1000 milliLiter(s) (75 mL/Hr) IV Continuous <Continuous>    MEDICATIONS  (PRN):  acetaminophen     Tablet .. 650 milliGRAM(s) Oral every 6 hours PRN Temp greater or equal to 38C (100.4F), Mild Pain (1 - 3)  ALBUTerol    90 MICROgram(s) HFA Inhaler 2 Puff(s) Inhalation every 6 hours PRN Shortness of Breath and/or Wheezing  clonazePAM  Tablet 2 milliGRAM(s) Oral two times a day PRN anxiety  LORazepam   Injectable 1 milliGRAM(s) IV Push once PRN severe anxietty    Home Medications:  CLONAZEPAM 2 MG TABLET: 1 each orally once a day (08 May 2022 22:36)  QUETIAPINE FUMARATE 200 MG TAB: 2 each orally once a day (at bedtime) (08 May 2022 22:36)    Vital Signs Last 24 Hrs  T(C): 35.6 (11 May 2022 05:13), Max: 36.3 (10 May 2022 19:31)  T(F): 96 (11 May 2022 05:13), Max: 97.4 (10 May 2022 19:31)  HR: 101 (11 May 2022 05:13) (89 - 101)  BP: 118/81 (11 May 2022 05:13) (118/81 - 129/63)  BP(mean): 95 (11 May 2022 05:13) (95 - 100)  RR: 18 (11 May 2022 05:13) (18 - 18)  SpO2: --  CAPILLARY BLOOD GLUCOSE        LABS:                        12.0   7.35  )-----------( 300      ( 11 May 2022 06:49 )             36.0     05-11    141  |  107  |  13  ----------------------------<  118<H>  4.2   |  22  |  0.5<L>    Ca    9.7      11 May 2022 06:49  Phos  6.2     05-11  Mg     2.0     05-11    TPro  7.0  /  Alb  4.2  /  TBili  0.4  /  DBili  x   /  AST  37  /  ALT  30  /  AlkPhos  79  05-11    LIVER FUNCTIONS - ( 11 May 2022 06:49 )  Alb: 4.2 g/dL / Pro: 7.0 g/dL / ALK PHOS: 79 U/L / ALT: 30 U/L / AST: 37 U/L / GGT: x                           Culture - Blood (collected 09 May 2022 08:46)  Source: .Blood None  Preliminary Report (10 May 2022 19:05):    No growth to date.    Culture - Urine (collected 08 May 2022 23:53)  Source: Clean Catch Clean Catch (Midstream)  Final Report (10 May 2022 12:53):    <10,000 CFU/mL Normal Urogenital [place]      Consultant Notes Reviewed:  [x ] YES  [ ] NO  Care Discussed with Consultants/Other Providers/ Housestaff [ x] YES  [ ] NO  Radiology, labs, new studies personally reviewed.

## 2022-05-11 NOTE — PROGRESS NOTE ADULT - ATTENDING COMMENTS
Visited the patient with resident on 5/11, Patient who was admitted to [facility] last week for two seizure episodes and presented with general weakness. MRI brain showed no acute infarct. VEEG and REEG showed no seizure or epileptiform discharges. UTOX is positive for Benzo and Visited the patient with resident on 5/11, Patient who was admitted to [facility] last week for two seizure episodes and presented with general weakness. MRI brain showed no acute infarct. VEEG and REEG showed no seizure or epileptiform discharges. UTOX is positive for Benzo and methadone rises the suspicions for secondary seizure. Brain MRI showed white matter changes with no enhancing lesion. Differentials include microvascular chronic disease vs CNS demyelinating disease. Recommend outpatient work up with her neurologist. She is scheduled to visit Dr Finn tomorrow. Will discharge the patient with no AED.

## 2022-05-11 NOTE — EEG REPORT - NS EEG TEXT BOX
VIDEO EEG FINAL REPORT      MEAGHAN DANGELO    51y Female  MRN [id number]      Recording Technique: The patient underwent continuous video-EEG monitoring using Telemetry System hardware on the XL [employer] Digital System. EEG and video data were stored on a computer hard drive with important events saved in digital archive files. The material was reviewed by a physician (electroencephalographer/epileptologist) on a daily basis. Daniel and seizure detection algorithms were utilized if needed. An EEG technician attended to the patient for 8 to 10 hours per day. The patient was observed by the epilepsy nursing staff 24 hrs per day. The epilepsy center neurologist was available in person or on call 24 hours per day.    Placement and Labeling of Eelectrodes: The EEG was performed using at least 20 channel referential EEG connections (coronal over temporal over parasaggital montage) with inferior temporal electrodes when indicting and using all standard 10-20 electrode placement with EKG, with additional electrodes placed in the inferior temporal region using the modified 10-10 montage electrode placements for elective admissions, or if deemed necessary. Recording was at a sampling rate of 256 samples per second per channel. Time syncronized digital video recording was done simultaneously with EEG recording. A low light infrared camera was used for low light recording.      HPI:  50 y/o female with hx of seizure( recently got discharged from [facility] for new onset of seizure, not any AED, suppose to follow up with Dr. Finn), current smoker, bipolar disease depression who was brought in for AMS, ?seizure, slurred speech. Patient states her LKW- was yesterday, unknown time. Patient states that her son said she possibly had a seizure while she was sleeping where both her hands were moving up and down that possibly could be between ~2-7 AM. Patient states she did not come earlier to the hospital because of her kids. Patient is a poor historian. Reports that she noticed B/L lower extremity weakness when she woke up this morning and could not walk. Patient states she feels unsteady on her feet, but does not believe she swaying to one side. Also noted the slurred speech this morning as well. Patient was a stroke code as a pre note for dysarthria as per EMS. Patient states she had 2 seizures where she had foaming in the mouth which is why she went to [facility], does not remember the events. EMS noticed dysarthria on scene. Admits to headache, feeling confused "out of this world" experience, , dizziness. Denies nausea, vomiting, tongue biting, urinary, fecal incontinence. Does not take any blood thinners. CTH-negative for acute findings. CTA-no LVO. CTP-Apparent 33 cc mismatch volume within the right temporal lobe and bilateral areas of the cerebellum and frontal lobes that is likely artifactual. Neurology saw him in ED, Discussed case with [facility] attending, Dr. Matamoros, not a tpa candidate as LKW- out of window.     in ED, VS were stable,  labs done showed WBC Count: 14.58 Hemoglobin: 11.5 Mean Cell Volume: 101.8  pt received Aspirin, plavix loading dose.   pt is admitted for workup/management (08 May 2022 21:55)      MEDICATIONS  (STANDING):  atorvastatin 20 milliGRAM(s) Oral at bedtime  enoxaparin Injectable 40 milliGRAM(s) SubCutaneous every 24 hours  folic acid 2 milliGRAM(s) Oral daily  melatonin 5 milliGRAM(s) Oral at bedtime  nicotine - 21 mG/24Hr(s) Patch 1 patch Transdermal daily  polyethylene glycol 3350 17 Gram(s) Oral two times a day  QUEtiapine 400 milliGRAM(s) Oral at bedtime  senna 2 Tablet(s) Oral at bedtime  sodium chloride 0.9%. 1000 milliLiter(s) (75 mL/Hr) IV Continuous <Continuous>    MEDICATIONS  (PRN):  acetaminophen     Tablet .. 650 milliGRAM(s) Oral every 6 hours PRN Temp greater or equal to 38C (100.4F), Mild Pain (1 - 3)  ALBUTerol    90 MICROgram(s) HFA Inhaler 2 Puff(s) Inhalation every 6 hours PRN Shortness of Breath and/or Wheezing  clonazePAM  Tablet 2 milliGRAM(s) Oral two times a day PRN anxiety  LORazepam   Injectable 1 milliGRAM(s) IV Push once PRN severe anxietty        AWAKE  The recording during the wakefulness consists of a symmetrical and well-organized background and posterior dominant rhythm in the range of 9 Hz, which is reactive to eye opening and eye closure and change in the level of alertness.      ASLEEP  Different stages of sleep were preserved well. Stage II of non-REM sleep was characterized by the presence of symmetrical and well-defined sleep spindles and vertex sharp waves. The deeper stages of sleep were identified by the presence of low theta and delta range activity seen diffusely over both hemispheres and more prominently from the frontal and central derivations. All stages captured and symmetric.      GENERALIZED SLOWING  None    FOCAL SLOWING    None  BREACH ARTIFACT  None    ACTIVATION PROCEDURES  Hyperventilation:  Photic Stimulation:    NONE  SLEEP DEPRIVATION/MEDICATION REDUCTION      EPILEPTIFORM ACTIVITY  None          EVENTS/SEIZURES    None  IMPRESSION  Normal VEEG     CLINICAL CORRELATION  A normal VEEG study does not exclude the clinical diagnosis of epilespy, but no supporting evidence was present on this study.

## 2022-05-13 LAB
EDDP UR QL CFM: NEGATIVE NG/ML — SIGNIFICANT CHANGE UP
EDDP, UR RESULT: NEGATIVE NG/ML — SIGNIFICANT CHANGE UP
METHADONE IN-HOUSE INTERPRETATION: NEGATIVE — SIGNIFICANT CHANGE UP
METHADONE UR CFM-MCNC: NEGATIVE — SIGNIFICANT CHANGE UP

## 2022-05-14 LAB
CULTURE RESULTS: SIGNIFICANT CHANGE UP
SPECIMEN SOURCE: SIGNIFICANT CHANGE UP

## 2022-05-18 DIAGNOSIS — F31.9 BIPOLAR DISORDER, UNSPECIFIED: ICD-10-CM

## 2022-05-18 DIAGNOSIS — Z91.14 PATIENT'S OTHER NONCOMPLIANCE WITH MEDICATION REGIMEN: ICD-10-CM

## 2022-05-18 DIAGNOSIS — F17.210 NICOTINE DEPENDENCE, CIGARETTES, UNCOMPLICATED: ICD-10-CM

## 2022-05-18 DIAGNOSIS — D52.9 FOLATE DEFICIENCY ANEMIA, UNSPECIFIED: ICD-10-CM

## 2022-05-18 DIAGNOSIS — R91.1 SOLITARY PULMONARY NODULE: ICD-10-CM

## 2022-05-18 DIAGNOSIS — G40.909 EPILEPSY, UNSPECIFIED, NOT INTRACTABLE, WITHOUT STATUS EPILEPTICUS: ICD-10-CM

## 2022-05-18 DIAGNOSIS — R56.9 UNSPECIFIED CONVULSIONS: ICD-10-CM

## 2022-05-18 DIAGNOSIS — R47.1 DYSARTHRIA AND ANARTHRIA: ICD-10-CM

## 2022-05-18 DIAGNOSIS — E78.5 HYPERLIPIDEMIA, UNSPECIFIED: ICD-10-CM

## 2022-05-18 DIAGNOSIS — I10 ESSENTIAL (PRIMARY) HYPERTENSION: ICD-10-CM

## 2022-05-18 DIAGNOSIS — D72.829 ELEVATED WHITE BLOOD CELL COUNT, UNSPECIFIED: ICD-10-CM

## 2022-05-18 DIAGNOSIS — J44.9 CHRONIC OBSTRUCTIVE PULMONARY DISEASE, UNSPECIFIED: ICD-10-CM

## 2022-05-18 DIAGNOSIS — F41.9 ANXIETY DISORDER, UNSPECIFIED: ICD-10-CM

## 2023-09-19 NOTE — PATIENT PROFILE ADULT - NSPROMEDSADMININFO_GEN_A_NUR
Check orthostatic vital signs and continue cautious IV fluid resuscitation is positive. PT/OT eval.  Patient ultimately may benefit from short-term rehab. no concerns